# Patient Record
Sex: FEMALE | Race: WHITE | HISPANIC OR LATINO | Employment: PART TIME | URBAN - METROPOLITAN AREA
[De-identification: names, ages, dates, MRNs, and addresses within clinical notes are randomized per-mention and may not be internally consistent; named-entity substitution may affect disease eponyms.]

---

## 2022-04-01 ENCOUNTER — HOSPITAL ENCOUNTER (OUTPATIENT)
Dept: GENERAL RADIOLOGY | Facility: HOSPITAL | Age: 37
Discharge: HOME | End: 2022-04-01
Admitting: NURSE PRACTITIONER

## 2022-04-01 ENCOUNTER — TRANSCRIBE ORDERS (OUTPATIENT)
Dept: ADMINISTRATIVE | Facility: HOSPITAL | Age: 37
End: 2022-04-01

## 2022-04-01 DIAGNOSIS — S82.202A TIBIA/FIBULA FRACTURE, LEFT, CLOSED, INITIAL ENCOUNTER: ICD-10-CM

## 2022-04-01 DIAGNOSIS — S82.402A TIBIA/FIBULA FRACTURE, LEFT, CLOSED, INITIAL ENCOUNTER: Primary | ICD-10-CM

## 2022-04-01 DIAGNOSIS — S82.202A TIBIA/FIBULA FRACTURE, LEFT, CLOSED, INITIAL ENCOUNTER: Primary | ICD-10-CM

## 2022-04-01 DIAGNOSIS — S82.402A TIBIA/FIBULA FRACTURE, LEFT, CLOSED, INITIAL ENCOUNTER: ICD-10-CM

## 2022-04-01 PROCEDURE — 73590 X-RAY EXAM OF LOWER LEG: CPT

## 2022-05-03 ENCOUNTER — TRANSCRIBE ORDERS (OUTPATIENT)
Dept: ADMINISTRATIVE | Facility: HOSPITAL | Age: 37
End: 2022-05-03

## 2022-05-03 ENCOUNTER — HOSPITAL ENCOUNTER (OUTPATIENT)
Dept: CARDIOLOGY | Facility: HOSPITAL | Age: 37
Discharge: HOME | End: 2022-05-03
Admitting: NURSE PRACTITIONER

## 2022-05-03 DIAGNOSIS — R60.0 LOCALIZED EDEMA: ICD-10-CM

## 2022-05-03 DIAGNOSIS — R60.0 LOCALIZED EDEMA: Primary | ICD-10-CM

## 2022-05-03 LAB
BH CV LOWER VASCULAR LEFT COMMON FEMORAL AUGMENT: NORMAL
BH CV LOWER VASCULAR LEFT COMMON FEMORAL COMPETENT: NORMAL
BH CV LOWER VASCULAR LEFT COMMON FEMORAL COMPRESS: NORMAL
BH CV LOWER VASCULAR LEFT COMMON FEMORAL PHASIC: NORMAL
BH CV LOWER VASCULAR LEFT COMMON FEMORAL SPONT: NORMAL
BH CV LOWER VASCULAR LEFT DISTAL FEMORAL COMPRESS: NORMAL
BH CV LOWER VASCULAR LEFT GREATER SAPH AK COMPRESS: NORMAL
BH CV LOWER VASCULAR LEFT MID FEMORAL AUGMENT: NORMAL
BH CV LOWER VASCULAR LEFT MID FEMORAL COMPETENT: NORMAL
BH CV LOWER VASCULAR LEFT MID FEMORAL COMPRESS: NORMAL
BH CV LOWER VASCULAR LEFT MID FEMORAL PHASIC: NORMAL
BH CV LOWER VASCULAR LEFT MID FEMORAL SPONT: NORMAL
BH CV LOWER VASCULAR LEFT PERONEAL COMPRESS: NORMAL
BH CV LOWER VASCULAR LEFT POPLITEAL AUGMENT: NORMAL
BH CV LOWER VASCULAR LEFT POPLITEAL COMPETENT: NORMAL
BH CV LOWER VASCULAR LEFT POPLITEAL COMPRESS: NORMAL
BH CV LOWER VASCULAR LEFT POPLITEAL PHASIC: NORMAL
BH CV LOWER VASCULAR LEFT POPLITEAL SPONT: NORMAL
BH CV LOWER VASCULAR LEFT POSTERIOR TIBIAL COMPRESS: NORMAL
BH CV LOWER VASCULAR LEFT PROXIMAL FEMORAL COMPRESS: NORMAL
BH CV LOWER VASCULAR RIGHT COMMON FEMORAL AUGMENT: NORMAL
BH CV LOWER VASCULAR RIGHT COMMON FEMORAL COMPETENT: NORMAL
BH CV LOWER VASCULAR RIGHT COMMON FEMORAL COMPRESS: NORMAL
BH CV LOWER VASCULAR RIGHT COMMON FEMORAL PHASIC: NORMAL
BH CV LOWER VASCULAR RIGHT COMMON FEMORAL SPONT: NORMAL
MAXIMAL PREDICTED HEART RATE: 186 BPM
STRESS TARGET HR: 158 BPM

## 2022-05-03 PROCEDURE — 93971 EXTREMITY STUDY: CPT

## 2022-05-03 PROCEDURE — 93971 EXTREMITY STUDY: CPT | Performed by: SURGERY

## 2022-06-23 ENCOUNTER — APPOINTMENT (OUTPATIENT)
Dept: GENERAL RADIOLOGY | Facility: HOSPITAL | Age: 37
End: 2022-06-23

## 2022-06-23 ENCOUNTER — HOSPITAL ENCOUNTER (EMERGENCY)
Facility: HOSPITAL | Age: 37
Discharge: HOME | End: 2022-06-24
Attending: EMERGENCY MEDICINE | Admitting: EMERGENCY MEDICINE

## 2022-06-23 DIAGNOSIS — G62.9 NEUROPATHY: Primary | ICD-10-CM

## 2022-06-23 PROCEDURE — 73590 X-RAY EXAM OF LOWER LEG: CPT

## 2022-06-23 PROCEDURE — 99283 EMERGENCY DEPT VISIT LOW MDM: CPT

## 2022-06-23 PROCEDURE — 73562 X-RAY EXAM OF KNEE 3: CPT

## 2022-06-24 VITALS
HEART RATE: 79 BPM | DIASTOLIC BLOOD PRESSURE: 84 MMHG | HEIGHT: 68 IN | SYSTOLIC BLOOD PRESSURE: 128 MMHG | OXYGEN SATURATION: 100 % | BODY MASS INDEX: 34.79 KG/M2 | RESPIRATION RATE: 16 BRPM | TEMPERATURE: 98.2 F

## 2022-06-24 NOTE — DISCHARGE INSTRUCTIONS
Please follow-up with neurology discussed possible need for EMG    Return to the emergency room for uncontrolled pain, paleness to the foot, coldness to the foot, weakness to the foot or any new numbness that you may develop
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
## 2022-08-26 ENCOUNTER — HOSPITAL ENCOUNTER (EMERGENCY)
Facility: HOSPITAL | Age: 37
Discharge: LEFT BEFORE DISCHARGE | End: 2022-08-26

## 2022-08-26 VITALS
HEART RATE: 93 BPM | DIASTOLIC BLOOD PRESSURE: 76 MMHG | RESPIRATION RATE: 19 BRPM | SYSTOLIC BLOOD PRESSURE: 122 MMHG | BODY MASS INDEX: 35.92 KG/M2 | HEIGHT: 68 IN | OXYGEN SATURATION: 99 % | TEMPERATURE: 98.5 F | WEIGHT: 236.99 LBS

## 2022-08-26 LAB
GLUCOSE BLDC GLUCOMTR-MCNC: 103 MG/DL (ref 70–99)
GLUCOSE BLDC GLUCOMTR-MCNC: 98 MG/DL (ref 70–99)
HOLD SPECIMEN: NORMAL
HOLD SPECIMEN: NORMAL
WHOLE BLOOD HOLD COAG: NORMAL
WHOLE BLOOD HOLD SPECIMEN: NORMAL

## 2022-08-26 PROCEDURE — 82962 GLUCOSE BLOOD TEST: CPT

## 2022-08-26 PROCEDURE — 99211 OFF/OP EST MAY X REQ PHY/QHP: CPT

## 2022-08-26 PROCEDURE — 36415 COLL VENOUS BLD VENIPUNCTURE: CPT

## 2022-08-26 RX ORDER — SODIUM CHLORIDE 0.9 % (FLUSH) 0.9 %
10 SYRINGE (ML) INJECTION AS NEEDED
Status: DISCONTINUED | OUTPATIENT
Start: 2022-08-26 | End: 2022-08-26 | Stop reason: HOSPADM

## 2022-12-16 ENCOUNTER — HOSPITAL ENCOUNTER (OUTPATIENT)
Dept: GENERAL RADIOLOGY | Facility: HOSPITAL | Age: 37
Discharge: HOME | End: 2022-12-16

## 2022-12-16 ENCOUNTER — TRANSCRIBE ORDERS (OUTPATIENT)
Dept: GENERAL RADIOLOGY | Facility: HOSPITAL | Age: 37
End: 2022-12-16

## 2022-12-16 ENCOUNTER — TRANSCRIBE ORDERS (OUTPATIENT)
Dept: ADMINISTRATIVE | Facility: HOSPITAL | Age: 37
End: 2022-12-16

## 2022-12-16 DIAGNOSIS — M79.605 PAIN IN LEFT LEG: ICD-10-CM

## 2022-12-16 DIAGNOSIS — M79.605 PAIN IN LEFT LEG: Primary | ICD-10-CM

## 2022-12-16 DIAGNOSIS — R13.13 PHARYNGEAL DYSPHAGIA: Primary | ICD-10-CM

## 2022-12-16 PROCEDURE — 73502 X-RAY EXAM HIP UNI 2-3 VIEWS: CPT

## 2022-12-16 PROCEDURE — 73590 X-RAY EXAM OF LOWER LEG: CPT

## 2022-12-27 ENCOUNTER — HOSPITAL ENCOUNTER (OUTPATIENT)
Dept: GENERAL RADIOLOGY | Facility: HOSPITAL | Age: 37
Discharge: HOME | End: 2022-12-27
Admitting: NURSE PRACTITIONER

## 2022-12-27 DIAGNOSIS — R13.13 PHARYNGEAL DYSPHAGIA: ICD-10-CM

## 2022-12-27 PROCEDURE — 74221 X-RAY XM ESOPHAGUS 2CNTRST: CPT

## 2022-12-27 RX ADMIN — BARIUM SULFATE 700 MG: 700 TABLET ORAL at 09:36

## 2022-12-27 RX ADMIN — ANTACID/ANTIFLATULENT 1 PACKET: 380; 550; 10; 10 GRANULE, EFFERVESCENT ORAL at 09:36

## 2022-12-27 RX ADMIN — BARIUM SULFATE 355 ML: 0.6 SUSPENSION ORAL at 09:36

## 2022-12-27 RX ADMIN — BARIUM SULFATE 135 ML: 980 POWDER, FOR SUSPENSION ORAL at 09:36

## 2023-01-27 PROBLEM — M32.9 SYSTEMIC LUPUS ERYTHEMATOSUS: Status: ACTIVE | Noted: 2022-12-12

## 2023-01-27 PROBLEM — E78.1 HYPERTRIGLYCERIDEMIA: Status: ACTIVE | Noted: 2022-12-12

## 2023-01-27 PROBLEM — J45.909 ASTHMA: Status: ACTIVE | Noted: 2022-12-12

## 2023-01-30 ENCOUNTER — TRANSCRIBE ORDERS (OUTPATIENT)
Dept: LAB | Facility: HOSPITAL | Age: 38
End: 2023-01-30
Payer: COMMERCIAL

## 2023-01-30 ENCOUNTER — LAB (OUTPATIENT)
Dept: LAB | Facility: HOSPITAL | Age: 38
End: 2023-01-30
Payer: COMMERCIAL

## 2023-01-30 DIAGNOSIS — M25.50 PAIN IN JOINT, MULTIPLE SITES: ICD-10-CM

## 2023-01-30 DIAGNOSIS — Z79.899 ENCOUNTER FOR LONG-TERM (CURRENT) USE OF OTHER MEDICATIONS: Primary | ICD-10-CM

## 2023-01-30 DIAGNOSIS — Z01.89 DIAGNOSTIC SKIN AND SENSITIZATION TESTS: ICD-10-CM

## 2023-01-30 DIAGNOSIS — Z79.899 ENCOUNTER FOR LONG-TERM (CURRENT) USE OF OTHER MEDICATIONS: ICD-10-CM

## 2023-01-30 LAB
BASOPHILS # BLD AUTO: 0.06 10*3/MM3 (ref 0–0.2)
BASOPHILS NFR BLD AUTO: 0.7 % (ref 0–1.5)
BILIRUB UR QL STRIP: NEGATIVE
CLARITY UR: CLEAR
COLOR UR: YELLOW
DEPRECATED RDW RBC AUTO: 38.4 FL (ref 37–54)
EOSINOPHIL # BLD AUTO: 0.06 10*3/MM3 (ref 0–0.4)
EOSINOPHIL NFR BLD AUTO: 0.7 % (ref 0.3–6.2)
ERYTHROCYTE [DISTWIDTH] IN BLOOD BY AUTOMATED COUNT: 11.9 % (ref 12.3–15.4)
GLUCOSE UR STRIP-MCNC: NEGATIVE MG/DL
HCT VFR BLD AUTO: 38.8 % (ref 34–46.6)
HGB BLD-MCNC: 13.7 G/DL (ref 12–15.9)
HGB UR QL STRIP.AUTO: NEGATIVE
IMM GRANULOCYTES # BLD AUTO: 0.03 10*3/MM3 (ref 0–0.05)
IMM GRANULOCYTES NFR BLD AUTO: 0.3 % (ref 0–0.5)
KETONES UR QL STRIP: NEGATIVE
LEUKOCYTE ESTERASE UR QL STRIP.AUTO: NEGATIVE
LYMPHOCYTES # BLD AUTO: 2.2 10*3/MM3 (ref 0.7–3.1)
LYMPHOCYTES NFR BLD AUTO: 24.1 % (ref 19.6–45.3)
MCH RBC QN AUTO: 31.2 PG (ref 26.6–33)
MCHC RBC AUTO-ENTMCNC: 35.3 G/DL (ref 31.5–35.7)
MCV RBC AUTO: 88.4 FL (ref 79–97)
MONOCYTES # BLD AUTO: 0.49 10*3/MM3 (ref 0.1–0.9)
MONOCYTES NFR BLD AUTO: 5.4 % (ref 5–12)
NEUTROPHILS NFR BLD AUTO: 6.29 10*3/MM3 (ref 1.7–7)
NEUTROPHILS NFR BLD AUTO: 68.8 % (ref 42.7–76)
NITRITE UR QL STRIP: NEGATIVE
NRBC BLD AUTO-RTO: 0 /100 WBC (ref 0–0.2)
PH UR STRIP.AUTO: 6 [PH] (ref 5–8)
PLATELET # BLD AUTO: 255 10*3/MM3 (ref 140–450)
PMV BLD AUTO: 10.5 FL (ref 6–12)
PROT UR QL STRIP: NEGATIVE
RBC # BLD AUTO: 4.39 10*6/MM3 (ref 3.77–5.28)
SP GR UR STRIP: >=1.03 (ref 1–1.03)
UROBILINOGEN UR QL STRIP: NORMAL
WBC NRBC COR # BLD: 9.13 10*3/MM3 (ref 3.4–10.8)

## 2023-01-30 PROCEDURE — 81003 URINALYSIS AUTO W/O SCOPE: CPT

## 2023-01-30 PROCEDURE — 86235 NUCLEAR ANTIGEN ANTIBODY: CPT

## 2023-01-30 PROCEDURE — 85652 RBC SED RATE AUTOMATED: CPT

## 2023-01-30 PROCEDURE — 85025 COMPLETE CBC W/AUTO DIFF WBC: CPT

## 2023-01-30 PROCEDURE — 86431 RHEUMATOID FACTOR QUANT: CPT

## 2023-01-30 PROCEDURE — 82550 ASSAY OF CK (CPK): CPT

## 2023-01-30 PROCEDURE — 86200 CCP ANTIBODY: CPT

## 2023-01-30 PROCEDURE — 80074 ACUTE HEPATITIS PANEL: CPT

## 2023-01-30 PROCEDURE — 86140 C-REACTIVE PROTEIN: CPT

## 2023-01-30 PROCEDURE — 36415 COLL VENOUS BLD VENIPUNCTURE: CPT

## 2023-01-30 PROCEDURE — 86038 ANTINUCLEAR ANTIBODIES: CPT

## 2023-01-31 LAB
CCP IGA+IGG SERPL IA-ACNC: 0 UNITS (ref 0–19)
CHROMATIN AB SERPL-ACNC: 0.2 AI (ref 0–0.9)
CK SERPL-CCNC: 48 U/L (ref 20–180)
CRP SERPL-MCNC: <0.3 MG/DL (ref 0–0.5)
DSDNA AB SER-ACNC: 1 IU/ML (ref 0–9)
ENA RNP AB SER-ACNC: <0.2 AI (ref 0–0.9)
ENA SM AB SER-ACNC: <0.2 AI (ref 0–0.9)
ENA SS-A AB SER-ACNC: <0.2 AI (ref 0–0.9)
ENA SS-B AB SER-ACNC: <0.2 AI (ref 0–0.9)
ERYTHROCYTE [SEDIMENTATION RATE] IN BLOOD: 10 MM/HR (ref 0–20)
HAV IGM SERPL QL IA: NORMAL
HBV CORE IGM SERPL QL IA: NORMAL
HBV SURFACE AG SERPL QL IA: NORMAL
HCV AB SER DONR QL: NORMAL
RHEUMATOID FACT SERPL-ACNC: <10 IU/ML

## 2023-02-02 LAB
ANA HOMOGEN TITR SER: ABNORMAL {TITER}
ANA SER QL IF: POSITIVE
Lab: ABNORMAL

## 2023-03-27 ENCOUNTER — OFFICE VISIT (OUTPATIENT)
Dept: OBSTETRICS AND GYNECOLOGY | Facility: CLINIC | Age: 38
End: 2023-03-27
Payer: COMMERCIAL

## 2023-03-27 VITALS
HEIGHT: 68 IN | WEIGHT: 250 LBS | SYSTOLIC BLOOD PRESSURE: 117 MMHG | HEART RATE: 84 BPM | BODY MASS INDEX: 37.89 KG/M2 | DIASTOLIC BLOOD PRESSURE: 75 MMHG

## 2023-03-27 DIAGNOSIS — N76.0 ACUTE VAGINITIS: ICD-10-CM

## 2023-03-27 DIAGNOSIS — N94.10 FEMALE DYSPAREUNIA: Primary | ICD-10-CM

## 2023-03-27 DIAGNOSIS — N94.810 VESTIBULITIS, VULVAR: ICD-10-CM

## 2023-03-27 LAB
CANDIDA SPECIES: NEGATIVE
GARDNERELLA VAGINALIS: POSITIVE
T VAGINALIS DNA VAG QL PROBE+SIG AMP: NEGATIVE

## 2023-03-27 PROCEDURE — 87660 TRICHOMONAS VAGIN DIR PROBE: CPT | Performed by: OBSTETRICS & GYNECOLOGY

## 2023-03-27 PROCEDURE — 87510 GARDNER VAG DNA DIR PROBE: CPT | Performed by: OBSTETRICS & GYNECOLOGY

## 2023-03-27 PROCEDURE — 87480 CANDIDA DNA DIR PROBE: CPT | Performed by: OBSTETRICS & GYNECOLOGY

## 2023-03-27 NOTE — ASSESSMENT & PLAN NOTE
"States sex is painful  There is pain with manual stimulation that is focused near the anterior surface of the vagina  PIV intercourse is incredibly painful  Symptoms have only been present for the past 2 weeks  No change in partner  Doesn't feel dry   \"Feels like it is hitting a wall\" with penetration  S/p hysterectomy but still retains bilateral ovaries  Patient with hormonal vestibulitis on exam.  The vestibule has erythema and is tender to palpation anteriorly and posteriorly.  Urethral meatus is also tender to palpation  "

## 2023-03-27 NOTE — PROGRESS NOTES
"GYN new patient    CC: Dyspareunia    Tobacco/Nicotine use:  No    HPI:   37 y.o. Contraception or HRT: s/p HYST, still has one or both ovaries, benign indications  Pt has concerns she would like to discuss.      History: PMHx, Meds, Allergies, PSHx, Social Hx, and POBHx all reviewed and updated.  PCP:Quintin Stevenson MD      Review of Systems     /75   Pulse 84   Ht 172.7 cm (68\")   Wt 113 kg (250 lb)   Breastfeeding No   BMI 38.01 kg/m²     Physical Exam  Vitals and nursing note reviewed. Exam conducted with a chaperone present.   Constitutional:       Appearance: Normal appearance.   Cardiovascular:      Rate and Rhythm: Normal rate and regular rhythm.      Heart sounds: Normal heart sounds.   Pulmonary:      Effort: Pulmonary effort is normal.      Breath sounds: Normal breath sounds.   Abdominal:      General: Abdomen is flat. Bowel sounds are normal.      Palpations: Abdomen is soft.   Genitourinary:     Exam position: Lithotomy position.      Labia:         Right: No lesion.         Left: No lesion.       Urethra: No prolapse or urethral lesion.      Vagina: Tenderness ( Tenderness particularly along urethra.  No levator ani spasm or high tone pelvic floor dysfunction) present.      Cervix: Normal.      Uterus: Normal.        Neurological:      Mental Status: She is alert.         ASSESSMENT AND PLAN:  Problem Visit    Diagnoses and all orders for this visit:    1. Female dyspareunia (Primary)  Assessment & Plan:  States sex is painful  There is pain with manual stimulation that is focused near the anterior surface of the vagina  PIV intercourse is incredibly painful  Symptoms have only been present for the past 2 weeks  No change in partner  Doesn't feel dry   \"Feels like it is hitting a wall\" with penetration  S/p hysterectomy but still retains bilateral ovaries  Patient with hormonal vestibulitis on exam.  The vestibule has erythema and is tender to palpation anteriorly and posteriorly.  " Urethral meatus is also tender to palpation      2. Acute vaginitis  Assessment & Plan:  Patient with increasing vaginal discharge    Orders:  -     Gardnerella vaginalis, Trichomonas vaginalis, Candida albicans, DNA - Swab, Vagina    3. Vestibulitis, vulvar  -     testosterone; Rub in 1 pea size amount to vestibule 1-2 time daily.  Dispense: 30 g; Refill: 5      Counseling:     Vulvar vestibulitis and treatment with estrogen plus testosterone cream.  Would avoid penetrative intimacy for at least 6 weeks              Follow Up:  Return in about 6 weeks (around 5/8/2023).        Kimberly Guadalupe MD  03/27/2023

## 2023-03-28 DIAGNOSIS — B96.89 BV (BACTERIAL VAGINOSIS): Primary | ICD-10-CM

## 2023-03-28 DIAGNOSIS — N76.0 BV (BACTERIAL VAGINOSIS): Primary | ICD-10-CM

## 2023-03-28 RX ORDER — METRONIDAZOLE 500 MG/1
500 TABLET ORAL 3 TIMES DAILY
Qty: 21 TABLET | Refills: 0 | Status: SHIPPED | OUTPATIENT
Start: 2023-03-28 | End: 2023-04-04

## 2023-03-31 ENCOUNTER — TELEPHONE (OUTPATIENT)
Dept: OBSTETRICS AND GYNECOLOGY | Facility: CLINIC | Age: 38
End: 2023-03-31
Payer: COMMERCIAL

## 2023-03-31 NOTE — TELEPHONE ENCOUNTER
Patient called back and scheduled for an appointment. She was advised to try Sitz baths 2-3 times daily until she is seen.

## 2023-03-31 NOTE — TELEPHONE ENCOUNTER
Caller: Yandel Dean    Relationship to patient: Self    Best call back number: [phone]    Patient is needing: PT NOTICED VAGINAL LUMP TODAY, SHE IS WANTING TO BE SEEN ASAP, UNABLE TO WT, OK TO CALLBACK ANYTIME, OK TO LEAVE A VM

## 2023-05-19 ENCOUNTER — PREP FOR SURGERY (OUTPATIENT)
Dept: OTHER | Facility: HOSPITAL | Age: 38
End: 2023-05-19
Payer: COMMERCIAL

## 2023-05-19 DIAGNOSIS — J03.00 CHRONIC STREPTOCOCCAL TONSILLITIS: ICD-10-CM

## 2023-05-19 DIAGNOSIS — Z01.818 PREOP TESTING: Primary | ICD-10-CM

## 2023-05-19 DIAGNOSIS — R13.10 DYSPHAGIA: ICD-10-CM

## 2023-05-19 DIAGNOSIS — G47.33 OSA (OBSTRUCTIVE SLEEP APNEA): Primary | ICD-10-CM

## 2023-05-19 DIAGNOSIS — J35.01 CHRONIC STREPTOCOCCAL TONSILLITIS: ICD-10-CM

## 2023-05-19 DIAGNOSIS — J35.3 ADENOTONSILLAR HYPERTROPHY: ICD-10-CM

## 2023-05-22 PROBLEM — R13.10 DYSPHAGIA: Status: ACTIVE | Noted: 2023-05-22

## 2023-05-22 PROBLEM — J03.00 CHRONIC STREPTOCOCCAL TONSILLITIS: Status: ACTIVE | Noted: 2023-05-22

## 2023-05-22 PROBLEM — J35.3 ADENOTONSILLAR HYPERTROPHY: Status: ACTIVE | Noted: 2023-05-22

## 2023-05-22 PROBLEM — G47.33 OSA (OBSTRUCTIVE SLEEP APNEA): Status: ACTIVE | Noted: 2023-05-22

## 2023-05-22 PROBLEM — J35.01 CHRONIC STREPTOCOCCAL TONSILLITIS: Status: ACTIVE | Noted: 2023-05-22

## 2023-05-23 ENCOUNTER — TELEPHONE (OUTPATIENT)
Dept: OBSTETRICS AND GYNECOLOGY | Facility: CLINIC | Age: 38
End: 2023-05-23
Payer: COMMERCIAL

## 2023-05-23 RX ORDER — LEVOCETIRIZINE DIHYDROCHLORIDE 5 MG/1
5 TABLET, FILM COATED ORAL EVERY EVENING
COMMUNITY

## 2023-05-23 NOTE — PRE-PROCEDURE INSTRUCTIONS
PATIENT INSTRUCTED TO BE:    - NPO AFTER MIDNIGHT EXCEPT CAN HAVE CLEAR LIQUIDS 2 HOURS PRIOR TO SURGERY ARRIVAL TIME     - TO HOLD ALL VITAMINS, SUPPLEMENTS, NSAIDS FOR ONE WEEK PRIOR TO THEIR SURGICAL PROCEDURE    -  INSTRUCTED NO LOTIONS, JEWELRY, PIERCINGS, OR DEODORANT DAY OF SURGERY    - IF DIABETIC, CHECK BLOOD GLUCOSE IF LESS THAN 70 CALL PREOP AREA -AM OF SURGERY     - INSTRUCTED TO TAKE THE FOLLOWING MEDICATIONS THE DAY OF SURGERY:    INHALERS PRN, LIPITOR, XYZAL, PRILOSEC, BENADRYL PRN    PATIENT VERBALIZED UNDERSTANDING

## 2023-05-24 ENCOUNTER — LAB (OUTPATIENT)
Dept: LAB | Facility: HOSPITAL | Age: 38
End: 2023-05-24
Payer: COMMERCIAL

## 2023-05-24 DIAGNOSIS — Z01.818 PREOP TESTING: ICD-10-CM

## 2023-05-24 LAB
APTT PPP: 26.5 SECONDS (ref 24.2–34.2)
BASOPHILS # BLD AUTO: 0.07 10*3/MM3 (ref 0–0.2)
BASOPHILS NFR BLD AUTO: 0.8 % (ref 0–1.5)
DEPRECATED RDW RBC AUTO: 37.4 FL (ref 37–54)
EOSINOPHIL # BLD AUTO: 0.1 10*3/MM3 (ref 0–0.4)
EOSINOPHIL NFR BLD AUTO: 1.2 % (ref 0.3–6.2)
ERYTHROCYTE [DISTWIDTH] IN BLOOD BY AUTOMATED COUNT: 11.5 % (ref 12.3–15.4)
HCG SERPL QL: NEGATIVE
HCT VFR BLD AUTO: 40 % (ref 34–46.6)
HGB BLD-MCNC: 14.2 G/DL (ref 12–15.9)
IMM GRANULOCYTES # BLD AUTO: 0.04 10*3/MM3 (ref 0–0.05)
IMM GRANULOCYTES NFR BLD AUTO: 0.5 % (ref 0–0.5)
INR PPP: 1.02 (ref 0.86–1.15)
LYMPHOCYTES # BLD AUTO: 2.57 10*3/MM3 (ref 0.7–3.1)
LYMPHOCYTES NFR BLD AUTO: 30.6 % (ref 19.6–45.3)
MCH RBC QN AUTO: 31.1 PG (ref 26.6–33)
MCHC RBC AUTO-ENTMCNC: 35.5 G/DL (ref 31.5–35.7)
MCV RBC AUTO: 87.7 FL (ref 79–97)
MONOCYTES # BLD AUTO: 0.54 10*3/MM3 (ref 0.1–0.9)
MONOCYTES NFR BLD AUTO: 6.4 % (ref 5–12)
NEUTROPHILS NFR BLD AUTO: 5.09 10*3/MM3 (ref 1.7–7)
NEUTROPHILS NFR BLD AUTO: 60.5 % (ref 42.7–76)
NRBC BLD AUTO-RTO: 0 /100 WBC (ref 0–0.2)
PLATELET # BLD AUTO: 312 10*3/MM3 (ref 140–450)
PMV BLD AUTO: 11.5 FL (ref 6–12)
PROTHROMBIN TIME: 13.5 SECONDS (ref 11.8–14.9)
RBC # BLD AUTO: 4.56 10*6/MM3 (ref 3.77–5.28)
WBC NRBC COR # BLD: 8.41 10*3/MM3 (ref 3.4–10.8)

## 2023-05-24 PROCEDURE — 84703 CHORIONIC GONADOTROPIN ASSAY: CPT

## 2023-05-24 PROCEDURE — 85610 PROTHROMBIN TIME: CPT

## 2023-05-24 PROCEDURE — 85025 COMPLETE CBC W/AUTO DIFF WBC: CPT

## 2023-05-24 PROCEDURE — 85730 THROMBOPLASTIN TIME PARTIAL: CPT

## 2023-05-24 PROCEDURE — 36415 COLL VENOUS BLD VENIPUNCTURE: CPT

## 2023-05-26 ENCOUNTER — HOSPITAL ENCOUNTER (OUTPATIENT)
Facility: HOSPITAL | Age: 38
Setting detail: HOSPITAL OUTPATIENT SURGERY
Discharge: HOME | End: 2023-05-26
Attending: OTOLARYNGOLOGY | Admitting: OTOLARYNGOLOGY
Payer: COMMERCIAL

## 2023-05-26 ENCOUNTER — ANESTHESIA EVENT (OUTPATIENT)
Dept: PERIOP | Facility: HOSPITAL | Age: 38
End: 2023-05-26
Payer: COMMERCIAL

## 2023-05-26 ENCOUNTER — ANESTHESIA (OUTPATIENT)
Dept: PERIOP | Facility: HOSPITAL | Age: 38
End: 2023-05-26
Payer: COMMERCIAL

## 2023-05-26 VITALS
HEART RATE: 87 BPM | DIASTOLIC BLOOD PRESSURE: 80 MMHG | BODY MASS INDEX: 40.56 KG/M2 | WEIGHT: 267.64 LBS | HEIGHT: 68 IN | TEMPERATURE: 98.1 F | OXYGEN SATURATION: 99 % | RESPIRATION RATE: 16 BRPM | SYSTOLIC BLOOD PRESSURE: 142 MMHG

## 2023-05-26 DIAGNOSIS — R13.10 DYSPHAGIA: ICD-10-CM

## 2023-05-26 DIAGNOSIS — G47.33 OSA (OBSTRUCTIVE SLEEP APNEA): ICD-10-CM

## 2023-05-26 DIAGNOSIS — J03.00 CHRONIC STREPTOCOCCAL TONSILLITIS: ICD-10-CM

## 2023-05-26 DIAGNOSIS — J35.3 ADENOTONSILLAR HYPERTROPHY: ICD-10-CM

## 2023-05-26 DIAGNOSIS — G89.18 POSTOPERATIVE PAIN: Primary | ICD-10-CM

## 2023-05-26 DIAGNOSIS — J35.01 CHRONIC STREPTOCOCCAL TONSILLITIS: ICD-10-CM

## 2023-05-26 PROCEDURE — 25010000002 HYDROMORPHONE 1 MG/ML SOLUTION: Performed by: NURSE ANESTHETIST, CERTIFIED REGISTERED

## 2023-05-26 PROCEDURE — 25010000002 PROPOFOL 10 MG/ML EMULSION: Performed by: NURSE ANESTHETIST, CERTIFIED REGISTERED

## 2023-05-26 PROCEDURE — 25010000002 MIDAZOLAM PER 1MG: Performed by: ANESTHESIOLOGY

## 2023-05-26 PROCEDURE — 88304 TISSUE EXAM BY PATHOLOGIST: CPT | Performed by: OTOLARYNGOLOGY

## 2023-05-26 PROCEDURE — 25010000002 ONDANSETRON PER 1 MG: Performed by: NURSE ANESTHETIST, CERTIFIED REGISTERED

## 2023-05-26 PROCEDURE — 25010000002 FENTANYL CITRATE (PF) 50 MCG/ML SOLUTION: Performed by: NURSE ANESTHETIST, CERTIFIED REGISTERED

## 2023-05-26 PROCEDURE — 0 MEPERIDINE PER 100 MG: Performed by: NURSE ANESTHETIST, CERTIFIED REGISTERED

## 2023-05-26 PROCEDURE — 25010000002 SUGAMMADEX 200 MG/2ML SOLUTION: Performed by: NURSE ANESTHETIST, CERTIFIED REGISTERED

## 2023-05-26 PROCEDURE — 25010000002 DEXAMETHASONE PER 1 MG: Performed by: NURSE ANESTHETIST, CERTIFIED REGISTERED

## 2023-05-26 RX ORDER — LIDOCAINE HYDROCHLORIDE 20 MG/ML
INJECTION, SOLUTION EPIDURAL; INFILTRATION; INTRACAUDAL; PERINEURAL AS NEEDED
Status: DISCONTINUED | OUTPATIENT
Start: 2023-05-26 | End: 2023-05-26 | Stop reason: SURG

## 2023-05-26 RX ORDER — OLANZAPINE 10 MG/1
10 TABLET ORAL NIGHTLY
COMMUNITY

## 2023-05-26 RX ORDER — MEPERIDINE HYDROCHLORIDE 25 MG/ML
12.5 INJECTION INTRAMUSCULAR; INTRAVENOUS; SUBCUTANEOUS
Status: DISCONTINUED | OUTPATIENT
Start: 2023-05-26 | End: 2023-05-26 | Stop reason: HOSPADM

## 2023-05-26 RX ORDER — FENTANYL CITRATE 50 UG/ML
INJECTION, SOLUTION INTRAMUSCULAR; INTRAVENOUS AS NEEDED
Status: DISCONTINUED | OUTPATIENT
Start: 2023-05-26 | End: 2023-05-26 | Stop reason: SURG

## 2023-05-26 RX ORDER — LIDOCAINE HYDROCHLORIDE AND EPINEPHRINE 10; 10 MG/ML; UG/ML
INJECTION, SOLUTION INFILTRATION; PERINEURAL AS NEEDED
Status: DISCONTINUED | OUTPATIENT
Start: 2023-05-26 | End: 2023-05-26 | Stop reason: HOSPADM

## 2023-05-26 RX ORDER — ROCURONIUM BROMIDE 10 MG/ML
INJECTION, SOLUTION INTRAVENOUS AS NEEDED
Status: DISCONTINUED | OUTPATIENT
Start: 2023-05-26 | End: 2023-05-26 | Stop reason: SURG

## 2023-05-26 RX ORDER — DEXAMETHASONE SODIUM PHOSPHATE 4 MG/ML
INJECTION, SOLUTION INTRA-ARTICULAR; INTRALESIONAL; INTRAMUSCULAR; INTRAVENOUS; SOFT TISSUE AS NEEDED
Status: DISCONTINUED | OUTPATIENT
Start: 2023-05-26 | End: 2023-05-26 | Stop reason: SURG

## 2023-05-26 RX ORDER — PROCHLORPERAZINE EDISYLATE 5 MG/ML
5 INJECTION INTRAMUSCULAR; INTRAVENOUS ONCE
Status: DISCONTINUED | OUTPATIENT
Start: 2023-05-26 | End: 2023-05-26 | Stop reason: HOSPADM

## 2023-05-26 RX ORDER — ONDANSETRON 2 MG/ML
INJECTION INTRAMUSCULAR; INTRAVENOUS AS NEEDED
Status: DISCONTINUED | OUTPATIENT
Start: 2023-05-26 | End: 2023-05-26 | Stop reason: SURG

## 2023-05-26 RX ORDER — MIDAZOLAM HYDROCHLORIDE 2 MG/2ML
4 INJECTION, SOLUTION INTRAMUSCULAR; INTRAVENOUS ONCE
Status: COMPLETED | OUTPATIENT
Start: 2023-05-26 | End: 2023-05-26

## 2023-05-26 RX ORDER — ZIPRASIDONE HYDROCHLORIDE 40 MG/1
40 CAPSULE ORAL 2 TIMES DAILY WITH MEALS
COMMUNITY

## 2023-05-26 RX ORDER — OXYCODONE HYDROCHLORIDE 5 MG/1
5 TABLET ORAL
Status: DISCONTINUED | OUTPATIENT
Start: 2023-05-26 | End: 2023-05-26 | Stop reason: HOSPADM

## 2023-05-26 RX ORDER — ONDANSETRON 2 MG/ML
4 INJECTION INTRAMUSCULAR; INTRAVENOUS ONCE AS NEEDED
Status: DISCONTINUED | OUTPATIENT
Start: 2023-05-26 | End: 2023-05-26 | Stop reason: HOSPADM

## 2023-05-26 RX ORDER — SODIUM CHLORIDE, SODIUM LACTATE, POTASSIUM CHLORIDE, CALCIUM CHLORIDE 600; 310; 30; 20 MG/100ML; MG/100ML; MG/100ML; MG/100ML
9 INJECTION, SOLUTION INTRAVENOUS CONTINUOUS PRN
Status: DISCONTINUED | OUTPATIENT
Start: 2023-05-26 | End: 2023-05-26 | Stop reason: HOSPADM

## 2023-05-26 RX ORDER — DESVENLAFAXINE 25 MG/1
25 TABLET, EXTENDED RELEASE ORAL DAILY
COMMUNITY

## 2023-05-26 RX ORDER — PROPOFOL 10 MG/ML
VIAL (ML) INTRAVENOUS AS NEEDED
Status: DISCONTINUED | OUTPATIENT
Start: 2023-05-26 | End: 2023-05-26 | Stop reason: SURG

## 2023-05-26 RX ORDER — PROMETHAZINE HYDROCHLORIDE 25 MG/1
25 SUPPOSITORY RECTAL ONCE AS NEEDED
Status: DISCONTINUED | OUTPATIENT
Start: 2023-05-26 | End: 2023-05-26 | Stop reason: HOSPADM

## 2023-05-26 RX ORDER — ACETAMINOPHEN 500 MG
1000 TABLET ORAL ONCE
Status: COMPLETED | OUTPATIENT
Start: 2023-05-26 | End: 2023-05-26

## 2023-05-26 RX ORDER — PROMETHAZINE HYDROCHLORIDE 12.5 MG/1
25 TABLET ORAL ONCE AS NEEDED
Status: DISCONTINUED | OUTPATIENT
Start: 2023-05-26 | End: 2023-05-26 | Stop reason: HOSPADM

## 2023-05-26 RX ADMIN — ACETAMINOPHEN 1000 MG: 500 TABLET ORAL at 08:15

## 2023-05-26 RX ADMIN — FENTANYL CITRATE 100 MCG: 50 INJECTION, SOLUTION INTRAMUSCULAR; INTRAVENOUS at 08:53

## 2023-05-26 RX ADMIN — SODIUM CHLORIDE, POTASSIUM CHLORIDE, SODIUM LACTATE AND CALCIUM CHLORIDE 9 ML/HR: 600; 310; 30; 20 INJECTION, SOLUTION INTRAVENOUS at 08:16

## 2023-05-26 RX ADMIN — HYDROMORPHONE HYDROCHLORIDE 0.5 MG: 1 INJECTION, SOLUTION INTRAMUSCULAR; INTRAVENOUS; SUBCUTANEOUS at 10:03

## 2023-05-26 RX ADMIN — PROPOFOL 200 MG: 10 INJECTION, EMULSION INTRAVENOUS at 08:53

## 2023-05-26 RX ADMIN — MIDAZOLAM HYDROCHLORIDE 4 MG: 1 INJECTION, SOLUTION INTRAMUSCULAR; INTRAVENOUS at 08:25

## 2023-05-26 RX ADMIN — ROCURONIUM BROMIDE 50 MG: 10 INJECTION, SOLUTION INTRAVENOUS at 08:53

## 2023-05-26 RX ADMIN — ONDANSETRON 4 MG: 2 INJECTION INTRAMUSCULAR; INTRAVENOUS at 09:03

## 2023-05-26 RX ADMIN — SUGAMMADEX 200 MG: 100 INJECTION, SOLUTION INTRAVENOUS at 09:27

## 2023-05-26 RX ADMIN — FENTANYL CITRATE 50 MCG: 50 INJECTION, SOLUTION INTRAMUSCULAR; INTRAVENOUS at 09:18

## 2023-05-26 RX ADMIN — DEXAMETHASONE SODIUM PHOSPHATE 4 MG: 4 INJECTION, SOLUTION INTRA-ARTICULAR; INTRALESIONAL; INTRAMUSCULAR; INTRAVENOUS; SOFT TISSUE at 09:03

## 2023-05-26 RX ADMIN — MEPERIDINE HYDROCHLORIDE 12.5 MG: 25 INJECTION INTRAMUSCULAR; INTRAVENOUS; SUBCUTANEOUS at 10:10

## 2023-05-26 RX ADMIN — FENTANYL CITRATE 50 MCG: 50 INJECTION, SOLUTION INTRAMUSCULAR; INTRAVENOUS at 09:13

## 2023-05-26 RX ADMIN — LIDOCAINE HYDROCHLORIDE 100 MG: 20 INJECTION, SOLUTION EPIDURAL; INFILTRATION; INTRACAUDAL; PERINEURAL at 08:53

## 2023-05-26 NOTE — DISCHARGE INSTRUCTIONS
DISCHARGE INSTRUCTIONS  TONSILLECTOMY/ADENOIDECTOMY  For your surgery you had:  General anesthesia (you may have a sore throat for the first 24 hours)  You received an anesthesia medication today that can cause hormonal forms of birth control to be ineffective. You should use a different form of birth control (to prevent pregnancy) for 7 days.  You may experience dizziness, drowsiness, or lightheadedness for several hours following surgery.  Do not stay alone today or tonight.  Limit your activity for 24 hours.  You should not drive or operate machinery, drink alcohol, or sign legally binding documents for 24 hours or while you are taking pain medication.  Resume your diet slowly.  Follow any special dietary instructions you may have been given by your doctor.    NOTIFY YOUR DOCTOR IF YOU EXPERIENCE ANY OF THE FOLLOWING:  Temperature greater than 102° Fahrenheit  Shaking chills  Redness or excessive drainage from incision  Nausea, vomiting and/or pain that is not controlled by prescribed medications  Increase in bleeding or bleeding that is excessive  Unable to urinate in 6 hours after surgery  If unable to reach your doctor, please go to the closest Emergency room Encourage the patient to drink liquids every hour the day of surgery and every two hours during the night.  We would like for the patient to drink at least 2-3 quarts of liquid within a 24-hour period.  Avoid red liquids.  Keep cool mist humidifier in the room with the patient.  If excessive bleeding should occur, bring the patient to the Emergency Room.  The ER doctor will notify the doctor.  If low grade fever develops, encourage the patient to drink more.  If temperature is over 102°, notify your doctor.  Rest is encouraged for several days following surgery.  Keep head elevated on at least one pillow.  Medications per physician instructions as indicated on Discharge Medication Information Sheet.      SPECIAL INSTRUCTIONS:  Follow verbal instructions  and written instructions on After Visit Summary given by Dr. Montero.      Last dose of pain medication was given at:  Tylenol (1000mg) last at 8:15am. Do not exceed 4000mg of tylenol in a 24 hour period.

## 2023-05-26 NOTE — OP NOTE
TONSILLECTOMY AND NASOPHARYNGOSCOPY Procedure Report    Patient Name:  Yandel Dean  YOB: 1985    Date of Surgery:  5/26/2023     Indications:    Yandel Dean is a 37 year old female who presents with chronic tonsillitis, dysphagia, DEVIN, and streptococcal tonsillitis.  A tonsillectomy and adenoidectomy was felt to be indicated due to the patient's history. The risks and benefits were explained including but not limited to pain, aural fullness, early and late bleeding, infection, risks of the general anesthesia, dysphagia and poor PO intake, and voice change/VPI.  Alternatives were discussed. Questions were asked appropriately answered.    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  NASOPHARYNGOSCOPY  2.  TONSILLECTOMY     Surgeon:  Wilner Montero MD      Staff:  Jose Strickland MD    Circulator: Mor Boothe RN; Manda Borjas RN  Scrub Person: Cordell Shi     Anesthesia: General    Estimated Blood Loss: 15 mL    Implants:    Nothing was implanted during the procedure    Specimen:          Specimens     ID Source Type Tests Collected By Collected At [facility]?    A Tonsil, Left Tissue · TISSUE PATHOLOGY EXAM   Wilner Montero MD 5/26/23 4467 No    Description: Left tonsil    B Tonsil, Right Tissue · TISSUE PATHOLOGY EXAM   Wilner Montero MD 5/26/23 0859 No    Description: Right tonsil        Findings:      1.  2+ tonsils with extensive cryptic debris and peritonsillar scarring  2.  No submucous cleft on palpation and inspection  3.  no adenoid tissue present and eustachian tubes unremarkable  4.  All dentitions left  intact     Complications: None     Description of Procedure:      Patient was taken to the operating room and general endotracheal anesthesia was performed in supine position.  After adequate anesthesia was obtained and endotracheal tube secured, table was turned.  Patient was then placed in hyperextension with mouthgag retractor in place.  Throat pack was placed and soft palate was retracted with red rubber catheter and indirect mirror examination of nasopharynx revealed no adenoid tissue and eustachian tubes were unremarkable.  Also palpation and inspection revealed no evidence of any submucous cleft.  Both tonsillar pillars were then injected with total of 10 mL of 1% Xylocaine with 1-100,000 epinephrine.  Using suction electrocautery both tonsils were dissected and removed. Both tonsillar fossa was cauterized for hemostasis.  Electrocautery settings were 120 cutting and 30 coag.  After obtaining excellent hemostasis,  throat pack was removed and orogastric tube was passed down to suction out gastric contents.  Mouthgag retractor was removed and all dentitions were noted to be intact.  At this point oral airway was placed. Subsequently patient was extubated and transported to recovery room in good condition following extubation.    Wilner Montero MD     Date: 5/26/2023  Time: 08:30 EDT

## 2023-05-26 NOTE — ANESTHESIA POSTPROCEDURE EVALUATION
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        Post Anesthesia Care and Evaluation    Patient location during evaluation: bedside  Patient participation: complete - patient participated  Level of consciousness: awake  Pain management: adequate    Airway patency: patent  PONV Status: none  Cardiovascular status: acceptable  Respiratory status: acceptable  Hydration status: acceptable    Comments: An Anesthesiologist personally participated in the most demanding procedures (including induction and emergence if applicable) in the anesthesia plan, monitored the course of anesthesia administration at frequent intervals and remained physically present and available for immediate diagnosis and treatment of emergencies.

## 2023-05-26 NOTE — ANESTHESIA PREPROCEDURE EVALUATION
Anesthesia Evaluation     Patient summary reviewed and Nursing notes reviewed   no history of anesthetic complications:  NPO Solid Status: > 8 hours  NPO Liquid Status: > 2 hours           Airway   Mallampati: II  TM distance: >3 FB  Neck ROM: full  No difficulty expected  Dental      Pulmonary - normal exam    breath sounds clear to auscultation  (+) asthma,recent URI, sleep apnea,   Cardiovascular - normal exam  Exercise tolerance: good (4-7 METS)    Rhythm: regular  Rate: normal    (+) hyperlipidemia,       Neuro/Psych  (+) headaches, psychiatric history,    GI/Hepatic/Renal/Endo - negative ROS     Musculoskeletal (-) negative ROS    Abdominal    Substance History - negative use     OB/GYN negative ob/gyn ROS         Other - negative ROS       ROS/Med Hx Other: PAT Nursing Notes unavailable.                 Anesthesia Plan    ASA 3     general     (Patient understands anesthesia not responsible for dental damage.    Pt extremely anxious, has PTSD, per pt avoid touching or startling her after surgery)  intravenous induction     Anesthetic plan, risks, benefits, and alternatives have been provided, discussed and informed consent has been obtained with: patient.    Use of blood products discussed with patient .   Plan discussed with CRNA.        CODE STATUS:

## 2023-05-26 NOTE — H&P
PRIMARY CARE PROVIDER: Quintin Stevenson MD  REFERRING PROVIDER: Quintin Stevenson MD    CHIEF COMPLAINT:  Preoperative evaluation for surgery    Subjective   History of Present Illness:  Yandel Dean is a  37 y.o.  female who is here for the following problems:    DEVIN (obstructive sleep apnea)    Adenotonsillar hypertrophy    Dysphagia    Chronic streptococcal tonsillitis      She is scheduled for TONSILLECTOMY AND ADENOIDECTOMY, NASOPHARYNGOSCOPY (Bilateral). There has been no significant change in the history since the preoperative office evaluation.     Review of Systems:  CONSTITUTIONAL: no fever or chills  PULMONARY: no cough or shortness of breath  GI: no nausea or vomiting    Past History:  Medical History: has a past medical history of Asthma, Bipolar disorder, Depression, Elevated cholesterol, Enlarged tonsils, Hypoglycemia, Lupus, Migraine, PTSD (post-traumatic stress disorder), and Recurrent streptococcal tonsillitis.   Surgical History: has a past surgical history that includes Hysterectomy and Leg Surgery (Left).   Family History: family history includes Breast cancer (age of onset: 35) in her maternal grandmother; Ovarian cancer (age of onset: 38) in her maternal grandmother.   Social History: reports that she quit smoking about 11 years ago. Her smoking use included cigarettes. She smoked an average of 1 pack per day. She has never been exposed to tobacco smoke. She has never used smokeless tobacco. She reports current alcohol use. She reports that she does not currently use drugs after having used the following drugs: Methamphetamines and Heroin.  Home Medications:  Vitamin D3, albuterol sulfate HFA, atorvastatin, diphenhydrAMINE, levocetirizine, omeprazole, and triamcinolone     Allergies: Penicillins and Sulfa antibiotics     Objective     Vital Signs:  Temp:  [97.8 °F (36.6 °C)] 97.8 °F (36.6 °C)  Heart Rate:  [90] 90  Resp:  [18] 18  BP: (129)/(90) 129/90    Physical Exam:  CONSTITUTIONAL: well nourished,  well-developed, alert, oriented, in no acute distress   COMMUNICATION AND VOICE: able to communicate normally, normal voice quality  HEAD: normocephalic, no lesions, atraumatic, no tenderness, no masses   FACE: appearance normal, no lesions, no tenderness, no deformities, facial motion symmetric  EYES: ocular motility normal, eyelids normal, orbits normal, no proptosis, conjunctiva normal , pupils equal, round   EARS:  Hearing: hearing to conversational voice intact bilaterally   External Ears: normal bilaterally, no lesions  NOSE:  External Nose: external nasal structure normal, no tenderness on palpation, no nasal discharge, no lesions, no evidence of trauma, nostrils patent   ORAL:  Lips: upper and lower lips without lesion   3+ TONSILS WITH CRYPTIC DEBRIS  NECK:  Inspection and Palpation: neck appearance normal, no masses or tenderness  CHEST/RESPIRATORY: normal respiratory effort   CARDIOVASCULAR: no cyanosis or edema   NEUROLOGICAL/PSYCHIATRIC: oriented to time, place and person, mood normal, affect appropriate, CN II-XII intact grossly    ASSESSMENT:    DEVIN (obstructive sleep apnea)    Adenotonsillar hypertrophy    Dysphagia    Chronic streptococcal tonsillitis    PLAN:  TONSILLECTOMY AND ADENOIDECTOMY, NASOPHARYNGOSCOPY (Bilateral)  The risks and benefits were explained including but not limited to early and late bleeding, infection, risks of the general anesthesia, dysphagia and poor PO intake, and voice change/VPI.  Alternatives were discussed. The patient/parents understood these risks and wanted to proceed. Questions were asked appropriately answered.      Wilner Montero MD  05/26/23  07:12 EDT

## 2023-05-27 ENCOUNTER — HOSPITAL ENCOUNTER (EMERGENCY)
Facility: HOSPITAL | Age: 38
Discharge: HOME | End: 2023-05-27
Attending: EMERGENCY MEDICINE
Payer: COMMERCIAL

## 2023-05-27 VITALS
DIASTOLIC BLOOD PRESSURE: 68 MMHG | HEART RATE: 101 BPM | SYSTOLIC BLOOD PRESSURE: 121 MMHG | OXYGEN SATURATION: 97 % | TEMPERATURE: 97 F | BODY MASS INDEX: 42.2 KG/M2 | RESPIRATION RATE: 18 BRPM | HEIGHT: 68 IN | WEIGHT: 278.44 LBS

## 2023-05-27 DIAGNOSIS — G89.18 POSTOPERATIVE PAIN: Primary | ICD-10-CM

## 2023-05-27 LAB
HCT VFR BLD AUTO: 35 % (ref 34–46.6)
HGB BLD-MCNC: 12.5 G/DL (ref 12–15.9)

## 2023-05-27 PROCEDURE — 85018 HEMOGLOBIN: CPT | Performed by: EMERGENCY MEDICINE

## 2023-05-27 PROCEDURE — 36415 COLL VENOUS BLD VENIPUNCTURE: CPT

## 2023-05-27 PROCEDURE — 85014 HEMATOCRIT: CPT | Performed by: EMERGENCY MEDICINE

## 2023-05-27 PROCEDURE — 99282 EMERGENCY DEPT VISIT SF MDM: CPT

## 2023-05-28 NOTE — ED PROVIDER NOTES
Time: 12:16 AM EDT  Date of encounter:  5/27/2023  Independent Historian/Clinical History and Information was obtained by:   Patient  Chief Complaint: Postoperative bleeding    History is limited by: N/A    History of Present Illness:  Patient is a 37 y.o. year old female who presents to the emergency department for evaluation of postoperative bleeding.  Patient presents to the emergency department status post tonsillectomy yesterday.  She reports she coughed several times and had what she believes to be a significant amount of blood come from her throat.  HPI    Patient Care Team  Primary Care Provider: Quintin Stevenson MD    Past Medical History:     Allergies   Allergen Reactions   • Penicillins Anaphylaxis   • Sulfa Antibiotics Anaphylaxis     Past Medical History:   Diagnosis Date   • Asthma     USES INHALERS   • Bipolar disorder    • Depression    • Elevated cholesterol    • Enlarged tonsils    • Hypoglycemia    • Lupus    • Migraine    • PTSD (post-traumatic stress disorder)    • Recurrent streptococcal tonsillitis      Past Surgical History:   Procedure Laterality Date   • HYSTERECTOMY     • LEG SURGERY Left     plate, juan, bolts and screws from knee to ankle   • TONSILLECTOMY AND ADENOIDECTOMY Bilateral 5/26/2023    Procedure: TONSILLECTOMY AND NASOPHARYNGOSCOPY;  Surgeon: Wilner Montero MD;  Location: [facility] MAIN OR;  Service: ENT;  Laterality: Bilateral;     Family History   Problem Relation Age of Onset   • Breast cancer Maternal Grandmother 35   • Ovarian cancer Maternal Grandmother 38   • Uterine cancer Neg Hx    • Cervical cancer Neg Hx    • Colon cancer Neg Hx    • Stomach cancer Neg Hx    • Skin cancer Neg Hx        Home Medications:  Prior to Admission medications    Medication Sig Start Date End Date Taking? Authorizing Provider   albuterol sulfate  (90 Base) MCG/ACT inhaler Every 6 (Six) Hours. 12/14/22   Provider, MD Etienne   atorvastatin (LIPITOR) 10 MG tablet Take 1 tablet by mouth  Daily. 23   Etienne Lamar MD   Cholecalciferol (Vitamin D3) 50 MCG (2000 UT) tablet Take 2 tablets by mouth Daily. 3/28/23   Etienne Lamar MD   Desvenlafaxine Succinate ER 25 MG tablet sustained-release 24 hour Take 1 tablet by mouth Daily.    Etienne Lamar MD   diphenhydrAMINE (BENADRYL) 25 mg capsule Take  by mouth Every 6 (Six) Hours As Needed for Itching.    Emergency, Nurse Angie, RN   HYDROcodone-acetaminophen (HYCET) 7.5-325 MG/15ML solution Take 15 mL by mouth Every 6 (Six) Hours As Needed for Moderate Pain. 23   Wilner Montero MD   levocetirizine (XYZAL) 5 MG tablet Take 1 tablet by mouth Every Evening.    Etienne Lamar MD   OLANZapine (zyPREXA) 10 MG tablet Take 1 tablet by mouth Every Night.    Etienne Lamar MD   omeprazole (priLOSEC) 20 MG capsule Take 1 capsule by mouth Daily. 23   Etienne Lamar MD   triamcinolone (KENALOG) 0.1 % cream triamcinolone acetonide 0.1 % topical cream   APPLY A THIN LAYER TO THE AFFECTED AREA(S) BY TOPICAL ROUTE 2 TIMES PER DAY    Etienne Lamar MD   ziprasidone (GEODON) 40 MG capsule Take 1 capsule by mouth 2 (Two) Times a Day With Meals.    Etienne Lamar MD        Social History:   Social History     Tobacco Use   • Smoking status: Former     Packs/day: 1.00     Types: Cigarettes     Quit date:      Years since quittin.4     Passive exposure: Never   • Smokeless tobacco: Never   Vaping Use   • Vaping Use: Never used   Substance Use Topics   • Alcohol use: Yes     Comment: rare   • Drug use: Not Currently     Types: Methamphetamines, Heroin     Comment: off 3 yrs         Review of Systems:  Review of Systems   Constitutional: Negative for chills and fever.   HENT: Negative for congestion, rhinorrhea and sore throat.    Eyes: Negative for pain and visual disturbance.   Respiratory: Negative for apnea, cough, chest tightness and shortness of breath.    Cardiovascular: Negative for chest pain  "and palpitations.   Gastrointestinal: Negative for abdominal pain, diarrhea, nausea and vomiting.   Genitourinary: Negative for difficulty urinating and dysuria.   Musculoskeletal: Negative for joint swelling and myalgias.   Skin: Negative for color change.   Neurological: Negative for seizures and headaches.   Psychiatric/Behavioral: Negative.    All other systems reviewed and are negative.       Physical Exam:  /68 (BP Location: Right arm, Patient Position: Sitting)   Pulse 101   Temp 97 °F (36.1 °C) (Oral)   Resp 18   Ht 172.7 cm (68\")   Wt 126 kg (278 lb 7.1 oz)   SpO2 97%   BMI 42.34 kg/m²     Physical Exam  Vitals and nursing note reviewed.   Constitutional:       General: She is not in acute distress.     Appearance: Normal appearance. She is not toxic-appearing.   HENT:      Head: Normocephalic and atraumatic.      Jaw: There is normal jaw occlusion.      Mouth/Throat:      Comments: Expected postoperative changes in the oropharynx and tonsillar beds.  No active bleeding or oozing identified at this time.  Eyes:      General: Lids are normal.      Extraocular Movements: Extraocular movements intact.      Conjunctiva/sclera: Conjunctivae normal.      Pupils: Pupils are equal, round, and reactive to light.   Cardiovascular:      Rate and Rhythm: Normal rate and regular rhythm.      Pulses: Normal pulses.      Heart sounds: Normal heart sounds.   Pulmonary:      Effort: Pulmonary effort is normal. No respiratory distress.      Breath sounds: Normal breath sounds. No wheezing or rhonchi.   Abdominal:      General: Abdomen is flat.      Palpations: Abdomen is soft.      Tenderness: There is no abdominal tenderness. There is no guarding or rebound.   Musculoskeletal:         General: Normal range of motion.      Cervical back: Normal range of motion and neck supple.      Right lower leg: No edema.      Left lower leg: No edema.   Skin:     General: Skin is warm and dry.   Neurological:      Mental " Status: She is alert and oriented to person, place, and time. Mental status is at baseline.   Psychiatric:         Mood and Affect: Mood normal.                  Procedures:  Procedures      Medical Decision Making:      Comorbidities that affect care:    Lupus, DEVIN    External Notes reviewed:    Previous Clinic Note: ENT, prep for tonsillectomy surgery      The following orders were placed and all results were independently analyzed by me:  Orders Placed This Encounter   Procedures   • Hemoglobin & Hematocrit, Blood       Medications Given in the Emergency Department:  Medications - No data to display     ED Course:         Labs:    Lab Results (last 24 hours)     Procedure Component Value Units Date/Time    Hemoglobin & Hematocrit, Blood [[id number]]  (Normal) Collected: 05/27/23 2229    Specimen: Blood Updated: 05/27/23 2239     Hemoglobin 12.5 g/dL      Hematocrit 35.0 %            Imaging:    No Radiology Exams Resulted Within Past 24 Hours      Differential Diagnosis and Discussion:    Sore Throat: Differential diagnosis includes but is not limited to bacterial infection, viral infection, inhaled irritants, sinus drainage, thyroiditis, epiglottitis, and retropharyngeal abscess.    All labs were reviewed and interpreted by me.    MDM  Number of Diagnoses or Management Options  Postoperative pain  Diagnosis management comments: In summary this is a 37-year-old female who presents to the emergency department for evaluation of bleeding from my postop tonsillectomy yesterday.  On clinical examination there is no active bleeding identified.  Hemoglobin hematocrit laboratory evaluation was performed and was stable and normal limits.  Discussed with patient's operating surgeon who recommends that given no active bleeding, will stable laboratory evaluation patient is safe for discharge home and outpatient follow-up.  Very strict return to ER and follow-up instructions have been provided to the patient.              Patient Care Considerations:    CT neck however there is no active bleeding on examination, H&H is stable and normal.      Consultants/Shared Management Plan:    Consultant: I have discussed the case with Dr. Montero who states If H&H stable and no active bleeding patient is safe for discharge home and outpatient follow-up.    Social Determinants of Health:    Patient is independent, reliable, and has access to care.       Disposition and Care Coordination:    Discharged: The patient is suitable and stable for discharge with no need for consideration of observation or admission.    I have explained the patient´s condition, diagnoses and treatment plan based on the information available to me at this time. I have answered questions and addressed any concerns. The patient has a good  understanding of the patient´s diagnosis, condition, and treatment plan as can be expected at this point. The vital signs have been stable. The patient´s condition is stable and appropriate for discharge from the emergency department.      The patient will pursue further outpatient evaluation with the primary care physician or other designated or consulting physician as outlined in the discharge instructions. They are agreeable to this plan of care and follow-up instructions have been explained in detail. The patient has received these instructions in written format and have expressed an understanding of the discharge instructions. The patient is aware that any significant change in condition or worsening of symptoms should prompt an immediate return to this or the closest emergency department or call to 911.  I have explained discharge medications and the need for follow up with the patient/caretakers. This was also printed in the discharge instructions. Patient was discharged with the following medications and follow up:      Medication List      No changes were made to your prescriptions during this visit.      Wilner Montero,  MD  [address] [place] [id number]  [phone]      As scheduled       Final diagnoses:   Postoperative pain        ED Disposition     ED Disposition   Discharge    Condition   Stable    Comment   --             This medical record created using voice recognition software.           Cyrus Moore MD  05/28/23 0045

## 2023-05-30 LAB
CYTO UR: NORMAL
LAB AP CASE REPORT: NORMAL
LAB AP CLINICAL INFORMATION: NORMAL
PATH REPORT.FINAL DX SPEC: NORMAL
PATH REPORT.GROSS SPEC: NORMAL

## 2023-07-26 ENCOUNTER — TELEPHONE (OUTPATIENT)
Dept: OBSTETRICS AND GYNECOLOGY | Facility: CLINIC | Age: 38
End: 2023-07-26

## 2023-10-02 ENCOUNTER — TELEPHONE (OUTPATIENT)
Dept: ORTHOPEDIC SURGERY | Facility: CLINIC | Age: 38
End: 2023-10-02
Payer: COMMERCIAL

## 2023-10-02 NOTE — TELEPHONE ENCOUNTER
LEFT TIB/FIB. RCV'D AND INDEXED- PHYSICIAN LETTER 9.12.23, ED NOTES 6.2.21, XR TIBIA & FIBULA 2V LT 6.29.21, 7.20.21. PT DID HAVE SURGERY IN [place].

## 2023-10-30 ENCOUNTER — OFFICE VISIT (OUTPATIENT)
Dept: ORTHOPEDIC SURGERY | Facility: CLINIC | Age: 38
End: 2023-10-30
Payer: COMMERCIAL

## 2023-10-30 VITALS
HEIGHT: 68 IN | SYSTOLIC BLOOD PRESSURE: 129 MMHG | OXYGEN SATURATION: 97 % | WEIGHT: 293 LBS | HEART RATE: 90 BPM | DIASTOLIC BLOOD PRESSURE: 87 MMHG | BODY MASS INDEX: 44.41 KG/M2

## 2023-10-30 DIAGNOSIS — M67.02 CONTRACTURE OF LEFT ACHILLES TENDON: ICD-10-CM

## 2023-10-30 DIAGNOSIS — M79.605 LEFT LEG PAIN: Primary | ICD-10-CM

## 2023-10-30 PROCEDURE — 99203 OFFICE O/P NEW LOW 30 MIN: CPT | Performed by: STUDENT IN AN ORGANIZED HEALTH CARE EDUCATION/TRAINING PROGRAM

## 2023-10-30 PROCEDURE — 1160F RVW MEDS BY RX/DR IN RCRD: CPT | Performed by: STUDENT IN AN ORGANIZED HEALTH CARE EDUCATION/TRAINING PROGRAM

## 2023-10-30 PROCEDURE — 1159F MED LIST DOCD IN RCRD: CPT | Performed by: STUDENT IN AN ORGANIZED HEALTH CARE EDUCATION/TRAINING PROGRAM
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
## 2023-12-06 ENCOUNTER — OFFICE VISIT (OUTPATIENT)
Dept: PODIATRY | Facility: CLINIC | Age: 38
End: 2023-12-06
Payer: COMMERCIAL

## 2023-12-06 VITALS
TEMPERATURE: 97.5 F | OXYGEN SATURATION: 97 % | WEIGHT: 293 LBS | DIASTOLIC BLOOD PRESSURE: 81 MMHG | HEIGHT: 68 IN | BODY MASS INDEX: 44.41 KG/M2 | HEART RATE: 97 BPM | SYSTOLIC BLOOD PRESSURE: 121 MMHG

## 2023-12-06 DIAGNOSIS — M24.573 EQUINUS CONTRACTURE OF ANKLE: Primary | ICD-10-CM

## 2023-12-06 RX ORDER — ZIPRASIDONE HYDROCHLORIDE 60 MG/1
60 CAPSULE ORAL 2 TIMES DAILY WITH MEALS
COMMUNITY
Start: 2023-11-08

## 2023-12-06 RX ORDER — SODIUM CHLORIDE 0.9 % (FLUSH) 0.9 %
10 SYRINGE (ML) INJECTION AS NEEDED
OUTPATIENT
Start: 2023-12-06

## 2023-12-06 RX ORDER — SODIUM CHLORIDE 0.9 % (FLUSH) 0.9 %
10 SYRINGE (ML) INJECTION EVERY 12 HOURS SCHEDULED
OUTPATIENT
Start: 2023-12-06

## 2023-12-06 RX ORDER — SODIUM CHLORIDE, SODIUM LACTATE, POTASSIUM CHLORIDE, CALCIUM CHLORIDE 600; 310; 30; 20 MG/100ML; MG/100ML; MG/100ML; MG/100ML
100 INJECTION, SOLUTION INTRAVENOUS CONTINUOUS
OUTPATIENT
Start: 2023-12-06

## 2023-12-06 RX ORDER — OLANZAPINE 10 MG/1
TABLET, ORALLY DISINTEGRATING ORAL
COMMUNITY
Start: 2023-11-25

## 2023-12-06 RX ORDER — ALPRAZOLAM 0.5 MG/1
0.5 TABLET ORAL 3 TIMES DAILY PRN
COMMUNITY
Start: 2023-11-08

## 2023-12-06 RX ORDER — SODIUM CHLORIDE 9 MG/ML
40 INJECTION, SOLUTION INTRAVENOUS AS NEEDED
OUTPATIENT
Start: 2023-12-06

## 2023-12-06 NOTE — PROGRESS NOTES
[facility]    Today's Date: 12/06/23    Patient Name: Yandel Dean  MRN: [id number]  CSN: [id number]  PCP: Luz Maria Phillips APRN,   Referring Provider: Hi Michelle MD    SUBJECTIVE     Chief Complaint   Patient presents with    Left Ankle - Establish Care, Pain     Previous surgery, 6/2021  Fracture surgery rods, plate, screws   Constant pain   Saw shamika Vazquez on chart, 10/30/23       HPI: Yandel Dean, a 38 y.o.female, presents to clinic.    Patient is a 38-year-old female presenting with left ankle pain.  Patient states she is unable to put her foot on the ground and has been walking on her toes.  Patient states that this happened after a injury in 2021.  She had a juan placed in her leg.  Patient states she did physical therapy and it did not help.  Past Medical History:   Diagnosis Date    Ankle pain, left     Asthma     USES INHALERS    Bipolar disorder     Depression     Elevated cholesterol     Enlarged tonsils     Hypoglycemia     Lupus     Migraine     PTSD (post-traumatic stress disorder)     Recurrent streptococcal tonsillitis      Past Surgical History:   Procedure Laterality Date    ANKLE SURGERY Left 06/2021    HYSTERECTOMY      LEG SURGERY Left     plate, juan, bolts and screws from knee to ankle    TONSILLECTOMY AND ADENOIDECTOMY Bilateral 05/26/2023    Procedure: TONSILLECTOMY AND NASOPHARYNGOSCOPY;  Surgeon: Wilner Montero MD;  Location: [facility] MAIN OR;  Service: ENT;  Laterality: Bilateral;     Family History   Problem Relation Age of Onset    Breast cancer Maternal Grandmother 35    Ovarian cancer Maternal Grandmother 38    Uterine cancer Neg Hx     Cervical cancer Neg Hx     Colon cancer Neg Hx     Stomach cancer Neg Hx     Skin cancer Neg Hx      Social History     Socioeconomic History    Marital status: Single    Number of children: 3   Tobacco Use    Smoking status: Former     Packs/day: 1     Types: Cigarettes     Quit date: 2012     Years since  quittin.9     Passive exposure: Never    Smokeless tobacco: Never   Vaping Use    Vaping Use: Never used   Substance and Sexual Activity    Alcohol use: Yes     Comment: rare    Drug use: Not Currently     Types: Methamphetamines, Heroin     Comment: off 3 yrs    Sexual activity: Defer     Partners: Male     Birth control/protection: Hysterectomy     Allergies   Allergen Reactions    Penicillins Anaphylaxis    Sulfa Antibiotics Anaphylaxis     Current Outpatient Medications   Medication Sig Dispense Refill    albuterol sulfate  (90 Base) MCG/ACT inhaler Every 6 (Six) Hours.      ALPRAZolam (XANAX) 0.5 MG tablet Take 1 tablet by mouth 3 (Three) Times a Day As Needed. for anxiety      atorvastatin (LIPITOR) 10 MG tablet Take 1 tablet by mouth Daily.      Cholecalciferol (Vitamin D3) 50 MCG (2000 UT) tablet Take 2 tablets by mouth Daily.      Desvenlafaxine Succinate ER 25 MG tablet sustained-release 24 hour Take 1 tablet by mouth Daily.      diphenhydrAMINE (BENADRYL) 25 mg capsule Take  by mouth Every 6 (Six) Hours As Needed for Itching.      levocetirizine (XYZAL) 5 MG tablet Take 1 tablet by mouth Every Evening.      OLANZapine zydis (ZyPREXA) 10 MG disintegrating tablet DISSOLVE 1 TABLET ON THE TONGUE AT BEDTIME AS NEEDED FOR ANXIETY OR AGITATION      omeprazole (priLOSEC) 20 MG capsule Take 1 capsule by mouth Daily.      triamcinolone (KENALOG) 0.1 % cream triamcinolone acetonide 0.1 % topical cream   APPLY A THIN LAYER TO THE AFFECTED AREA(S) BY TOPICAL ROUTE 2 TIMES PER DAY      ziprasidone (GEODON) 60 MG capsule Take 1 capsule by mouth 2 (Two) Times a Day With Meals.      HYDROcodone-acetaminophen (HYCET) 7.5-325 MG/15ML solution Take 15 mL by mouth Every 6 (Six) Hours As Needed for Moderate Pain. (Patient not taking: Reported on 2023) 960 mL 0     No current facility-administered medications for this visit.     Review of Systems   Constitutional: Negative.    HENT: Negative.     Eyes:  Negative.    Respiratory: Negative.     Cardiovascular: Negative.    Gastrointestinal: Negative.    Endocrine: Negative.    Genitourinary: Negative.    Musculoskeletal: Negative.         Left ankle pain   Skin: Negative.    Allergic/Immunologic: Negative.    Neurological: Negative.    Hematological: Negative.    Psychiatric/Behavioral: Negative.     All other systems reviewed and are negative.      OBJECTIVE     Vitals:    12/06/23 0733   BP: 121/81   Pulse: 97   Temp: 97.5 °F (36.4 °C)   SpO2: 97%       WBC   Date Value Ref Range Status   05/24/2023 8.41 3.40 - 10.80 10*3/mm3 Final     RBC   Date Value Ref Range Status   05/24/2023 4.56 3.77 - 5.28 10*6/mm3 Final     Hemoglobin   Date Value Ref Range Status   05/27/2023 12.5 12.0 - 15.9 g/dL Final     Hematocrit   Date Value Ref Range Status   05/27/2023 35.0 34.0 - 46.6 % Final     MCV   Date Value Ref Range Status   05/24/2023 87.7 79.0 - 97.0 fL Final     MCH   Date Value Ref Range Status   05/24/2023 31.1 26.6 - 33.0 pg Final     MCHC   Date Value Ref Range Status   05/24/2023 35.5 31.5 - 35.7 g/dL Final     RDW   Date Value Ref Range Status   05/24/2023 11.5 (L) 12.3 - 15.4 % Final     RDW-SD   Date Value Ref Range Status   05/24/2023 37.4 37.0 - 54.0 fl Final     MPV   Date Value Ref Range Status   05/24/2023 11.5 6.0 - 12.0 fL Final     Platelets   Date Value Ref Range Status   05/24/2023 312 140 - 450 10*3/mm3 Final     Neutrophil %   Date Value Ref Range Status   05/24/2023 60.5 42.7 - 76.0 % Final     Lymphocyte %   Date Value Ref Range Status   05/24/2023 30.6 19.6 - 45.3 % Final     Monocyte %   Date Value Ref Range Status   05/24/2023 6.4 5.0 - 12.0 % Final     Eosinophil %   Date Value Ref Range Status   05/24/2023 1.2 0.3 - 6.2 % Final     Basophil %   Date Value Ref Range Status   05/24/2023 0.8 0.0 - 1.5 % Final     Immature Grans %   Date Value Ref Range Status   05/24/2023 0.5 0.0 - 0.5 % Final     Neutrophils Absolute   Date Value Ref Range  "Status   09/29/2021 10.2 (H) 1.5 - 7.1 x10(3)/ul Final     Neutrophils, Absolute   Date Value Ref Range Status   05/24/2023 5.09 1.70 - 7.00 10*3/mm3 Final     Lymphocytes, Absolute   Date Value Ref Range Status   05/24/2023 2.57 0.70 - 3.10 10*3/mm3 Final     Monocytes, Absolute   Date Value Ref Range Status   05/24/2023 0.54 0.10 - 0.90 10*3/mm3 Final     Eosinophils Absolute   Date Value Ref Range Status   09/29/2021 0.1 0.0 - 0.7 x10(3)/ul Final     Eosinophils, Absolute   Date Value Ref Range Status   05/24/2023 0.10 0.00 - 0.40 10*3/mm3 Final     Basophils Absolute   Date Value Ref Range Status   09/29/2021 0.1 0.0 - 0.3 x10(3)/ul Final     Basophils, Absolute   Date Value Ref Range Status   05/24/2023 0.07 0.00 - 0.20 10*3/mm3 Final     Immature Grans, Absolute   Date Value Ref Range Status   05/24/2023 0.04 0.00 - 0.05 10*3/mm3 Final     nRBC   Date Value Ref Range Status   05/24/2023 0.0 0.0 - 0.2 /100 WBC Final         No results found for: \"GLUCOSE\", \"BUN\", \"CREATININE\", \"EGFRIFNONA\", \"EGFRIFAFRI\", \"BCR\", \"K\", \"CO2\", \"CALCIUM\", \"PROTENTOTREF\", \"ALBUMIN\", \"LABIL2\", \"BILIRUBIN\", \"AST\", \"ALT\"    Patient seen in no apparent distress.      PHYSICAL EXAM:     Foot/Ankle Exam    GENERAL  Appearance:  appears stated age  Orientation:  AAOx3  Affect:  appropriate  Gait:  unimpaired  Assistance:  independent  Right shoe gear: casual shoe  Left shoe gear: casual shoe    VASCULAR     Right Foot Vascularity   Normal vascular exam    Dorsalis pedis:  2+  Posterior tibial:  2+  Skin temperature:  warm  Edema grading:  None  CFT:  < 3 seconds  Pedal hair growth:  Present  Varicosities:  none     Left Foot Vascularity   Normal vascular exam    Dorsalis pedis:  2+  Posterior tibial:  2+  Skin temperature:  warm  Edema grading:  None  CFT:  < 3 seconds  Pedal hair growth:  Present  Varicosities:  none     NEUROLOGIC     Right Foot Neurologic   Normal sensation    Light touch sensation: normal  Vibratory sensation: " normal  Hot/Cold sensation: normal  Protective Sensation using [place]-Pavel Monofilament:   Sites intact: 10  Sites tested: 10     Left Foot Neurologic   Normal sensation    Light touch sensation: normal  Vibratory sensation: normal  Hot/Cold sensation:  normal  Protective Sensation using [place]-Pavel Monofilament:   Sites intact: 10  Sites tested: 10    MUSCULOSKELETAL     Left Foot Musculoskeletal   Tenderness:  retrocalcaneal bursa tenderness    MUSCLE STRENGTH     Right Foot Muscle Strength   Foot dorsiflexion:  4  Foot plantar flexion:  4  Foot inversion:  4  Foot eversion:  4     Left Foot Muscle Strength   Foot dorsiflexion:  4  Foot plantar flexion:  4  Foot inversion:  4  Foot eversion:  4    RANGE OF MOTION     Right Foot Range of Motion   Foot and ankle ROM within normal limits       Left Foot Range of Motion   Foot and ankle ROM within normal limits    Ankle dorsiflexion: decreased with pain    DERMATOLOGIC      Right Foot Dermatologic   Skin  Right foot skin is intact.      Left Foot Dermatologic   Skin  Left foot skin is intact.       RADIOLOGY:        No results found.    ASSESSMENT/PLAN     Diagnoses and all orders for this visit:    1. Equinus contracture of ankle (Primary)  -     Case Request; Standing  -     sodium chloride 0.9 % flush 10 mL  -     sodium chloride 0.9 % flush 10 mL  -     sodium chloride 0.9 % infusion 40 mL  -     lactated ringers infusion  -     clindamycin (CLEOCIN) 900 mg in dextrose (D5W) 5 % 100 mL IVPB  -     Case Request    Other orders  -     Follow Anesthesia Guidelines / Protocol; Future  -     Follow Anesthesia Guidelines / Protocol; Standing  -     Verify / Perform Chlorhexidine Skin Prep; Standing  -     Verify / Perform Chlorhexidine Skin Prep if Indicated (If Not Already Completed); Standing  -     Obtain Informed Consent; Future  -     Provide NPO Instructions to Patient; Future  -     Chlorhexidine Skin Prep; Future  -     Insert Peripheral IV;  Standing  -     Saline Lock & Maintain IV Access; Standing      Patient for Achilles lengthening versus gastroc recession.  Has failed physical therapy.    The risks and benefits of the surgery were discussed with the patient.  This discussion included possible complications of requiring further surgery, possible delayed wound healing, further surgery requiring amputation of the foot or leg, and also included the complication of death.  All the patient's questions were answered to their satisfaction.  Patient states they would like to proceed with the procedure.    Comprehensive lower extremity examination and evaluation was performed.    Discussed findings and treatment plan including risks, benefits, and treatment options with patient in detail. Patient agreed with treatment plan.    Medications and allergies reviewed.  Reviewed available lab values along with other pertinent labs.  These were discussed with the patient.    An After Visit Summary was printed and given to the patient at discharge, including (if requested) any available informative/educational handouts regarding diagnosis, treatment, or medications. All questions were answered to patient/family satisfaction. Should symptoms fail to improve or worsen they agree to call or return to clinic or to go to the Emergency Department. Discussed the importance of following up with any needed screening tests/labs/specialist appointments and any requested follow-up recommended by me today. Importance of maintaining follow-up discussed and patient accepts that missed appointments can delay diagnosis and potentially lead to worsening of conditions.    Return for Post op., or sooner if acute issues arise.    This document has been electronically signed by Adriel Fitzgerald DPM on December 6, 2023 08:27 EST

## 2024-01-12 NOTE — PRE-PROCEDURE INSTRUCTIONS
PATIENT INSTRUCTED TO BE:    - NPO AFTER MIDNIGHT EXCEPT CAN HAVE CLEAR LIQUIDS 2 HOURS PRIOR TO SURGERY ARRIVAL TIME     - TO HOLD ALL VITAMINS, SUPPLEMENTS, NSAIDS FOR ONE WEEK PRIOR TO THEIR SURGICAL PROCEDURE    - INSTRUCTED PT TO USE SURGICAL SOAP 1 TIME THE NIGHT PRIOR TO SURGERY OR THE AM OF SURGERY.   USE SOAP FROM NECK TO TOES AVOID THEIR FACE, HAIR, AND PRIVATE PARTS. INSTRUCTED NO LOTIONS, JEWELRY, PIERCINGS, OR DEODORANT DAY OF SURGERY        - INSTRUCTED TO TAKE THE FOLLOWING MEDICATIONS THE DAY OF SURGERY:   Xanax, Effexor, Geodon  If needed Albuterol inhaler      PATIENT VERBALIZED UNDERSTANDING

## 2024-01-16 ENCOUNTER — ANESTHESIA (OUTPATIENT)
Dept: PERIOP | Facility: HOSPITAL | Age: 39
End: 2024-01-16
Payer: COMMERCIAL

## 2024-01-16 ENCOUNTER — HOSPITAL ENCOUNTER (OUTPATIENT)
Facility: HOSPITAL | Age: 39
Setting detail: HOSPITAL OUTPATIENT SURGERY
Discharge: HOME | End: 2024-01-16
Attending: PODIATRIST | Admitting: PODIATRIST
Payer: COMMERCIAL

## 2024-01-16 ENCOUNTER — ANESTHESIA EVENT (OUTPATIENT)
Dept: PERIOP | Facility: HOSPITAL | Age: 39
End: 2024-01-16
Payer: COMMERCIAL

## 2024-01-16 VITALS
RESPIRATION RATE: 20 BRPM | OXYGEN SATURATION: 97 % | SYSTOLIC BLOOD PRESSURE: 112 MMHG | HEIGHT: 68 IN | TEMPERATURE: 97.9 F | BODY MASS INDEX: 44.41 KG/M2 | HEART RATE: 73 BPM | DIASTOLIC BLOOD PRESSURE: 71 MMHG | WEIGHT: 293 LBS

## 2024-01-16 DIAGNOSIS — M24.573 EQUINUS CONTRACTURE OF ANKLE: Primary | ICD-10-CM

## 2024-01-16 PROCEDURE — 25010000002 BUPIVACAINE (PF) 0.5 % SOLUTION 10 ML VIAL: Performed by: PODIATRIST

## 2024-01-16 PROCEDURE — 25010000002 DEXAMETHASONE PER 1 MG: Performed by: NURSE ANESTHETIST, CERTIFIED REGISTERED

## 2024-01-16 PROCEDURE — 25010000002 FENTANYL CITRATE (PF) 50 MCG/ML SOLUTION: Performed by: NURSE ANESTHETIST, CERTIFIED REGISTERED

## 2024-01-16 PROCEDURE — 25810000003 LACTATED RINGERS PER 1000 ML: Performed by: ANESTHESIOLOGY

## 2024-01-16 PROCEDURE — 25010000002 CLINDAMYCIN 900 MG/50ML SOLUTION: Performed by: PODIATRIST

## 2024-01-16 PROCEDURE — 25010000002 ONDANSETRON PER 1 MG: Performed by: NURSE ANESTHETIST, CERTIFIED REGISTERED

## 2024-01-16 PROCEDURE — 25010000002 MIDAZOLAM PER 1MG: Performed by: ANESTHESIOLOGY

## 2024-01-16 PROCEDURE — 25010000002 PROPOFOL 10 MG/ML EMULSION: Performed by: NURSE ANESTHETIST, CERTIFIED REGISTERED

## 2024-01-16 PROCEDURE — 25010000002 METOCLOPRAMIDE PER 10 MG: Performed by: ANESTHESIOLOGY

## 2024-01-16 PROCEDURE — 25010000002 SUGAMMADEX 200 MG/2ML SOLUTION: Performed by: NURSE ANESTHETIST, CERTIFIED REGISTERED

## 2024-01-16 PROCEDURE — 25010000002 LIDOCAINE 1 % SOLUTION 10 ML VIAL: Performed by: PODIATRIST

## 2024-01-16 RX ORDER — LIDOCAINE HYDROCHLORIDE 20 MG/ML
INJECTION, SOLUTION EPIDURAL; INFILTRATION; INTRACAUDAL; PERINEURAL AS NEEDED
Status: DISCONTINUED | OUTPATIENT
Start: 2024-01-16 | End: 2024-01-16 | Stop reason: SURG

## 2024-01-16 RX ORDER — HYDROCODONE BITARTRATE AND ACETAMINOPHEN 7.5; 325 MG/1; MG/1
1 TABLET ORAL EVERY 6 HOURS PRN
Qty: 30 TABLET | Refills: 0 | Status: SHIPPED | OUTPATIENT
Start: 2024-01-16

## 2024-01-16 RX ORDER — PROMETHAZINE HYDROCHLORIDE 25 MG/1
25 SUPPOSITORY RECTAL ONCE AS NEEDED
Status: DISCONTINUED | OUTPATIENT
Start: 2024-01-16 | End: 2024-01-16 | Stop reason: HOSPADM

## 2024-01-16 RX ORDER — PROMETHAZINE HYDROCHLORIDE 12.5 MG/1
25 TABLET ORAL ONCE AS NEEDED
Status: DISCONTINUED | OUTPATIENT
Start: 2024-01-16 | End: 2024-01-16 | Stop reason: HOSPADM

## 2024-01-16 RX ORDER — MAGNESIUM HYDROXIDE 1200 MG/15ML
LIQUID ORAL AS NEEDED
Status: DISCONTINUED | OUTPATIENT
Start: 2024-01-16 | End: 2024-01-16 | Stop reason: HOSPADM

## 2024-01-16 RX ORDER — PROMETHAZINE HYDROCHLORIDE 25 MG/1
25 TABLET ORAL EVERY 6 HOURS PRN
Qty: 20 TABLET | Refills: 0 | Status: SHIPPED | OUTPATIENT
Start: 2024-01-16

## 2024-01-16 RX ORDER — SODIUM CHLORIDE 9 MG/ML
40 INJECTION, SOLUTION INTRAVENOUS AS NEEDED
Status: DISCONTINUED | OUTPATIENT
Start: 2024-01-16 | End: 2024-01-16 | Stop reason: HOSPADM

## 2024-01-16 RX ORDER — MIDAZOLAM HYDROCHLORIDE 2 MG/2ML
2 INJECTION, SOLUTION INTRAMUSCULAR; INTRAVENOUS ONCE
Status: COMPLETED | OUTPATIENT
Start: 2024-01-16 | End: 2024-01-16

## 2024-01-16 RX ORDER — SODIUM CHLORIDE, SODIUM LACTATE, POTASSIUM CHLORIDE, CALCIUM CHLORIDE 600; 310; 30; 20 MG/100ML; MG/100ML; MG/100ML; MG/100ML
9 INJECTION, SOLUTION INTRAVENOUS CONTINUOUS PRN
Status: DISCONTINUED | OUTPATIENT
Start: 2024-01-16 | End: 2024-01-16 | Stop reason: HOSPADM

## 2024-01-16 RX ORDER — ONDANSETRON 2 MG/ML
4 INJECTION INTRAMUSCULAR; INTRAVENOUS ONCE AS NEEDED
Status: DISCONTINUED | OUTPATIENT
Start: 2024-01-16 | End: 2024-01-16 | Stop reason: HOSPADM

## 2024-01-16 RX ORDER — OXYCODONE HYDROCHLORIDE 5 MG/1
5 TABLET ORAL
Status: DISCONTINUED | OUTPATIENT
Start: 2024-01-16 | End: 2024-01-16 | Stop reason: HOSPADM

## 2024-01-16 RX ORDER — ACETAMINOPHEN 500 MG
1000 TABLET ORAL ONCE
Status: COMPLETED | OUTPATIENT
Start: 2024-01-16 | End: 2024-01-16

## 2024-01-16 RX ORDER — ONDANSETRON 2 MG/ML
INJECTION INTRAMUSCULAR; INTRAVENOUS AS NEEDED
Status: DISCONTINUED | OUTPATIENT
Start: 2024-01-16 | End: 2024-01-16 | Stop reason: SURG

## 2024-01-16 RX ORDER — SODIUM CHLORIDE, SODIUM LACTATE, POTASSIUM CHLORIDE, CALCIUM CHLORIDE 600; 310; 30; 20 MG/100ML; MG/100ML; MG/100ML; MG/100ML
100 INJECTION, SOLUTION INTRAVENOUS CONTINUOUS
Status: DISCONTINUED | OUTPATIENT
Start: 2024-01-16 | End: 2024-01-16 | Stop reason: HOSPADM

## 2024-01-16 RX ORDER — SODIUM CHLORIDE 0.9 % (FLUSH) 0.9 %
10 SYRINGE (ML) INJECTION EVERY 12 HOURS SCHEDULED
Status: DISCONTINUED | OUTPATIENT
Start: 2024-01-16 | End: 2024-01-16 | Stop reason: HOSPADM

## 2024-01-16 RX ORDER — DEXAMETHASONE SODIUM PHOSPHATE 4 MG/ML
INJECTION, SOLUTION INTRA-ARTICULAR; INTRALESIONAL; INTRAMUSCULAR; INTRAVENOUS; SOFT TISSUE AS NEEDED
Status: DISCONTINUED | OUTPATIENT
Start: 2024-01-16 | End: 2024-01-16 | Stop reason: SURG

## 2024-01-16 RX ORDER — CLINDAMYCIN PHOSPHATE 900 MG/50ML
900 INJECTION, SOLUTION INTRAVENOUS ONCE
Status: COMPLETED | OUTPATIENT
Start: 2024-01-16 | End: 2024-01-16

## 2024-01-16 RX ORDER — PROPOFOL 10 MG/ML
VIAL (ML) INTRAVENOUS AS NEEDED
Status: DISCONTINUED | OUTPATIENT
Start: 2024-01-16 | End: 2024-01-16 | Stop reason: SURG

## 2024-01-16 RX ORDER — METOCLOPRAMIDE HYDROCHLORIDE 5 MG/ML
10 INJECTION INTRAMUSCULAR; INTRAVENOUS ONCE AS NEEDED
Status: COMPLETED | OUTPATIENT
Start: 2024-01-16 | End: 2024-01-16

## 2024-01-16 RX ORDER — ROCURONIUM BROMIDE 10 MG/ML
INJECTION, SOLUTION INTRAVENOUS AS NEEDED
Status: DISCONTINUED | OUTPATIENT
Start: 2024-01-16 | End: 2024-01-16 | Stop reason: SURG

## 2024-01-16 RX ORDER — SUCCINYLCHOLINE/SOD CL,ISO/PF 100 MG/5ML
SYRINGE (ML) INTRAVENOUS AS NEEDED
Status: DISCONTINUED | OUTPATIENT
Start: 2024-01-16 | End: 2024-01-16 | Stop reason: SURG

## 2024-01-16 RX ORDER — FENTANYL CITRATE 50 UG/ML
INJECTION, SOLUTION INTRAMUSCULAR; INTRAVENOUS AS NEEDED
Status: DISCONTINUED | OUTPATIENT
Start: 2024-01-16 | End: 2024-01-16 | Stop reason: SURG

## 2024-01-16 RX ORDER — SODIUM CHLORIDE 0.9 % (FLUSH) 0.9 %
10 SYRINGE (ML) INJECTION AS NEEDED
Status: DISCONTINUED | OUTPATIENT
Start: 2024-01-16 | End: 2024-01-16 | Stop reason: HOSPADM

## 2024-01-16 RX ORDER — MEPERIDINE HYDROCHLORIDE 25 MG/ML
12.5 INJECTION INTRAMUSCULAR; INTRAVENOUS; SUBCUTANEOUS
Status: DISCONTINUED | OUTPATIENT
Start: 2024-01-16 | End: 2024-01-16 | Stop reason: HOSPADM

## 2024-01-16 RX ORDER — DEXMEDETOMIDINE HYDROCHLORIDE 100 UG/ML
INJECTION, SOLUTION INTRAVENOUS AS NEEDED
Status: DISCONTINUED | OUTPATIENT
Start: 2024-01-16 | End: 2024-01-16 | Stop reason: SURG

## 2024-01-16 RX ADMIN — METOCLOPRAMIDE HYDROCHLORIDE 10 MG: 5 INJECTION INTRAMUSCULAR; INTRAVENOUS at 11:36

## 2024-01-16 RX ADMIN — SUGAMMADEX 300 MG: 100 INJECTION, SOLUTION INTRAVENOUS at 12:21

## 2024-01-16 RX ADMIN — DEXMEDETOMIDINE HYDROCHLORIDE 5 MCG: 100 INJECTION, SOLUTION, CONCENTRATE INTRAVENOUS at 12:02

## 2024-01-16 RX ADMIN — DEXMEDETOMIDINE HYDROCHLORIDE 5 MCG: 100 INJECTION, SOLUTION, CONCENTRATE INTRAVENOUS at 12:19

## 2024-01-16 RX ADMIN — DEXMEDETOMIDINE HYDROCHLORIDE 15 MCG: 100 INJECTION, SOLUTION, CONCENTRATE INTRAVENOUS at 12:09

## 2024-01-16 RX ADMIN — ACETAMINOPHEN 1000 MG: 500 TABLET ORAL at 11:32

## 2024-01-16 RX ADMIN — PROPOFOL 250 MG: 10 INJECTION, EMULSION INTRAVENOUS at 11:46

## 2024-01-16 RX ADMIN — CLINDAMYCIN IN 5 PERCENT DEXTROSE 900 MG: 18 INJECTION, SOLUTION INTRAVENOUS at 11:40

## 2024-01-16 RX ADMIN — MIDAZOLAM HYDROCHLORIDE 2 MG: 1 INJECTION, SOLUTION INTRAMUSCULAR; INTRAVENOUS at 11:34

## 2024-01-16 RX ADMIN — ONDANSETRON 4 MG: 2 INJECTION INTRAMUSCULAR; INTRAVENOUS at 12:14

## 2024-01-16 RX ADMIN — OXYCODONE 5 MG: 5 TABLET ORAL at 12:48

## 2024-01-16 RX ADMIN — ROCURONIUM BROMIDE 5 MG: 10 INJECTION, SOLUTION INTRAVENOUS at 11:46

## 2024-01-16 RX ADMIN — ROCURONIUM BROMIDE 25 MG: 10 INJECTION, SOLUTION INTRAVENOUS at 12:00

## 2024-01-16 RX ADMIN — LIDOCAINE HYDROCHLORIDE 100 MG: 20 INJECTION, SOLUTION EPIDURAL; INFILTRATION; INTRACAUDAL; PERINEURAL at 11:46

## 2024-01-16 RX ADMIN — FENTANYL CITRATE 100 MCG: 50 INJECTION, SOLUTION INTRAMUSCULAR; INTRAVENOUS at 11:46

## 2024-01-16 RX ADMIN — ROCURONIUM BROMIDE 20 MG: 10 INJECTION, SOLUTION INTRAVENOUS at 12:12

## 2024-01-16 RX ADMIN — DEXAMETHASONE SODIUM PHOSPHATE 4 MG: 4 INJECTION, SOLUTION INTRAMUSCULAR; INTRAVENOUS at 11:46

## 2024-01-16 RX ADMIN — Medication 150 MG: at 11:46

## 2024-01-16 RX ADMIN — SODIUM CHLORIDE, POTASSIUM CHLORIDE, SODIUM LACTATE AND CALCIUM CHLORIDE 9 ML/HR: 600; 310; 30; 20 INJECTION, SOLUTION INTRAVENOUS at 11:34

## 2024-01-16 NOTE — ANESTHESIA POSTPROCEDURE EVALUATION
Patient: Yandel Dean    Procedure Summary       Date: 01/16/24 Room / Location: [facility] OR 04 / [facility] MAIN OR    Anesthesia Start: 1140 Anesthesia Stop: 1232    Procedure: LEFT ACHILLES TENDON LENGTHENING, LEFT GASTROCNEMIUS LENGTHENING (Left: Leg Lower) Diagnosis:       Equinus contracture of ankle      (Equinus contracture of ankle [M24.573])    Surgeons: Adriel Fitzgerald DPM Provider: Galdino Adams MD    Anesthesia Type: general ASA Status: 3            Anesthesia Type: general    Vitals  Vitals Value Taken Time   /69 01/16/24 1301   Temp 35.9 °C (96.7 °F) 01/16/24 1255   Pulse 75 01/16/24 1303   Resp 14 01/16/24 1300   SpO2 93 % 01/16/24 1303   Vitals shown include unfiled device data.        Post Anesthesia Care and Evaluation    Patient location during evaluation: bedside  Patient participation: complete - patient participated  Level of consciousness: awake  Pain management: adequate    Airway patency: patent  Anesthetic complications: No anesthetic complications  PONV Status: none  Cardiovascular status: acceptable and stable  Respiratory status: acceptable  Hydration status: acceptable    Comments: An Anesthesiologist personally participated in the most demanding procedures (including induction and emergence if applicable) in the anesthesia plan, monitored the course of anesthesia administration at frequent intervals and remained physically present and available for immediate diagnosis and treatment of emergencies.

## 2024-01-16 NOTE — H&P
H&P reviewed. The patient was examined and there are no changes to the H&P.           [facility] PODIATRY    Today's Date: 01/16/24    Patient Name: Yandel Dean  MRN: [id number]  CSN: [id number]  PCP: Luz Maria Phillips APRN,   Referring Provider: Adriel Fitzgerald DPM    SUBJECTIVE     No chief complaint on file.    HPI: Yandel Dean, a 38 y.o.female, presents to clinic.    Patient is a 38-year-old female presenting with left ankle pain.  Patient states she is unable to put her foot on the ground and has been walking on her toes.  Patient states that this happened after a injury in 2021.  She had a juan placed in her leg.  Patient states she did physical therapy and it did not help.  Past Medical History:   Diagnosis Date    Acid reflux     Ankle pain, left     Asthma     USES INHALERS    Bipolar disorder     Depression     Elevated cholesterol     H/O Enlarged tonsils     Hypoglycemia     Lupus     Migraine     PTSD (post-traumatic stress disorder)     no issues waking from anesthesia, would like to be told before she is touch    Recurrent streptococcal tonsillitis      Past Surgical History:   Procedure Laterality Date    ANKLE SURGERY Left 06/2021    HYSTERECTOMY      LEG SURGERY Left     plate, juan, bolts and screws from knee to ankle    TONSILLECTOMY AND ADENOIDECTOMY Bilateral 05/26/2023    Procedure: TONSILLECTOMY AND NASOPHARYNGOSCOPY;  Surgeon: Wilner Montero MD;  Location: [facility] OR;  Service: ENT;  Laterality: Bilateral;     Family History   Problem Relation Age of Onset    Breast cancer Maternal Grandmother 35    Ovarian cancer Maternal Grandmother 38    Uterine cancer Neg Hx     Cervical cancer Neg Hx     Colon cancer Neg Hx     Stomach cancer Neg Hx     Skin cancer Neg Hx      Social History     Socioeconomic History    Marital status: Single    Number of children: 3   Tobacco Use    Smoking status: Former     Packs/day: 1     Types: Cigarettes     Quit date: 2012     Years since  quittin.0     Passive exposure: Never    Smokeless tobacco: Never   Vaping Use    Vaping Use: Never used   Substance and Sexual Activity    Alcohol use: Yes     Comment: rare    Drug use: Not Currently     Types: Methamphetamines, Heroin     Comment: off 3 yrs    Sexual activity: Defer     Partners: Male     Birth control/protection: Hysterectomy     Allergies   Allergen Reactions    Penicillins Anaphylaxis    Sulfa Antibiotics Anaphylaxis     Current Facility-Administered Medications   Medication Dose Route Frequency Provider Last Rate Last Admin    clindamycin (CLEOCIN) 900 mg in dextrose 5% 50 mL IVPB (premix)  900 mg Intravenous Once Rotterman, Adriel T, DPM        lactated ringers infusion  100 mL/hr Intravenous Continuous Rotterman, Adriel T, DPM        sodium chloride 0.9 % flush 10 mL  10 mL Intravenous Q12H Rotterman, Adriel T, DPM        sodium chloride 0.9 % flush 10 mL  10 mL Intravenous PRN Rotterman, Adriel T, DPM        sodium chloride 0.9 % infusion 40 mL  40 mL Intravenous PRN Rotterman, Adriel T, DPM         Review of Systems   Constitutional: Negative.    HENT: Negative.     Eyes: Negative.    Respiratory: Negative.     Cardiovascular: Negative.    Gastrointestinal: Negative.    Endocrine: Negative.    Genitourinary: Negative.    Musculoskeletal: Negative.         Left ankle pain   Skin: Negative.    Allergic/Immunologic: Negative.    Neurological: Negative.    Hematological: Negative.    Psychiatric/Behavioral: Negative.     All other systems reviewed and are negative.      OBJECTIVE     Vitals:    24 0833   BP: 122/79   Pulse: 70   Resp: 18   Temp: 97.2 °F (36.2 °C)   SpO2: 98%       WBC   Date Value Ref Range Status   2023 8.41 3.40 - 10.80 10*3/mm3 Final     RBC   Date Value Ref Range Status   2023 4.56 3.77 - 5.28 10*6/mm3 Final     Hemoglobin   Date Value Ref Range Status   2023 12.5 12.0 - 15.9 g/dL Final     Hematocrit   Date Value Ref Range Status   2023 35.0  34.0 - 46.6 % Final     MCV   Date Value Ref Range Status   05/24/2023 87.7 79.0 - 97.0 fL Final     MCH   Date Value Ref Range Status   05/24/2023 31.1 26.6 - 33.0 pg Final     MCHC   Date Value Ref Range Status   05/24/2023 35.5 31.5 - 35.7 g/dL Final     RDW   Date Value Ref Range Status   05/24/2023 11.5 (L) 12.3 - 15.4 % Final     RDW-SD   Date Value Ref Range Status   05/24/2023 37.4 37.0 - 54.0 fl Final     MPV   Date Value Ref Range Status   05/24/2023 11.5 6.0 - 12.0 fL Final     Platelets   Date Value Ref Range Status   05/24/2023 312 140 - 450 10*3/mm3 Final     Neutrophil %   Date Value Ref Range Status   05/24/2023 60.5 42.7 - 76.0 % Final     Lymphocyte %   Date Value Ref Range Status   05/24/2023 30.6 19.6 - 45.3 % Final     Monocyte %   Date Value Ref Range Status   05/24/2023 6.4 5.0 - 12.0 % Final     Eosinophil %   Date Value Ref Range Status   05/24/2023 1.2 0.3 - 6.2 % Final     Basophil %   Date Value Ref Range Status   05/24/2023 0.8 0.0 - 1.5 % Final     Immature Grans %   Date Value Ref Range Status   05/24/2023 0.5 0.0 - 0.5 % Final     Neutrophils Absolute   Date Value Ref Range Status   09/29/2021 10.2 (H) 1.5 - 7.1 x10(3)/ul Final     Neutrophils, Absolute   Date Value Ref Range Status   05/24/2023 5.09 1.70 - 7.00 10*3/mm3 Final     Lymphocytes, Absolute   Date Value Ref Range Status   05/24/2023 2.57 0.70 - 3.10 10*3/mm3 Final     Monocytes, Absolute   Date Value Ref Range Status   05/24/2023 0.54 0.10 - 0.90 10*3/mm3 Final     Eosinophils Absolute   Date Value Ref Range Status   09/29/2021 0.1 0.0 - 0.7 x10(3)/ul Final     Eosinophils, Absolute   Date Value Ref Range Status   05/24/2023 0.10 0.00 - 0.40 10*3/mm3 Final     Basophils Absolute   Date Value Ref Range Status   09/29/2021 0.1 0.0 - 0.3 x10(3)/ul Final     Basophils, Absolute   Date Value Ref Range Status   05/24/2023 0.07 0.00 - 0.20 10*3/mm3 Final     Immature Grans, Absolute   Date Value Ref Range Status   05/24/2023 0.04  "0.00 - 0.05 10*3/mm3 Final     [facility]   Date Value Ref Range Status   05/24/2023 0.0 0.0 - 0.2 /100 WBC Final         No results found for: \"GLUCOSE\", \"BUN\", \"CREATININE\", \"EGFRIFNONA\", \"EGFRIFAFRI\", \"BCR\", \"K\", \"CO2\", \"CALCIUM\", \"PROTENTOTREF\", \"ALBUMIN\", \"LABIL2\", \"BILIRUBIN\", \"AST\", \"ALT\"    Patient seen in no apparent distress.      PHYSICAL EXAM:     Foot/Ankle Exam    GENERAL  Appearance:  appears stated age  Orientation:  AAOx3  Affect:  appropriate  Gait:  unimpaired  Assistance:  independent  Right shoe gear: casual shoe  Left shoe gear: casual shoe    VASCULAR     Right Foot Vascularity   Normal vascular exam    Dorsalis pedis:  2+  Posterior tibial:  2+  Skin temperature:  warm  Edema grading:  None  CFT:  < 3 seconds  Pedal hair growth:  Present  Varicosities:  none     Left Foot Vascularity   Normal vascular exam    Dorsalis pedis:  2+  Posterior tibial:  2+  Skin temperature:  warm  Edema grading:  None  CFT:  < 3 seconds  Pedal hair growth:  Present  Varicosities:  none     NEUROLOGIC     Right Foot Neurologic   Normal sensation    Light touch sensation: normal  Vibratory sensation: normal  Hot/Cold sensation: normal  Protective Sensation using Baggs-Pavel Monofilament:   Sites intact: 10  Sites tested: 10     Left Foot Neurologic   Normal sensation    Light touch sensation: normal  Vibratory sensation: normal  Hot/Cold sensation:  normal  Protective Sensation using Baggs-Pavel Monofilament:   Sites intact: 10  Sites tested: 10    MUSCULOSKELETAL     Left Foot Musculoskeletal   Tenderness:  retrocalcaneal bursa tenderness    MUSCLE STRENGTH     Right Foot Muscle Strength   Foot dorsiflexion:  4  Foot plantar flexion:  4  Foot inversion:  4  Foot eversion:  4     Left Foot Muscle Strength   Foot dorsiflexion:  4  Foot plantar flexion:  4  Foot inversion:  4  Foot eversion:  4    RANGE OF MOTION     Right Foot Range of Motion   Foot and ankle ROM within normal limits       Left Foot Range of " Motion   Foot and ankle ROM within normal limits    Ankle dorsiflexion: decreased with pain    DERMATOLOGIC      Right Foot Dermatologic   Skin  Right foot skin is intact.      Left Foot Dermatologic   Skin  Left foot skin is intact.       RADIOLOGY:        No results found.    ASSESSMENT/PLAN     Diagnoses and all orders for this visit:    1. Equinus contracture of ankle  -     sodium chloride 0.9 % flush 10 mL  -     sodium chloride 0.9 % flush 10 mL  -     sodium chloride 0.9 % infusion 40 mL  -     lactated ringers infusion  -     clindamycin (CLEOCIN) 900 mg in dextrose 5% 50 mL IVPB (premix)    Other orders  -     Follow Anesthesia Guidelines / Protocol; Standing  -     Verify / Perform Chlorhexidine Skin Prep; Standing  -     Verify / Perform Chlorhexidine Skin Prep if Indicated (If Not Already Completed); Standing  -     Insert Peripheral IV; Standing  -     Saline Lock & Maintain IV Access; Standing  -     Follow Anesthesia Guidelines / Protocol  -     Verify / Perform Chlorhexidine Skin Prep  -     Verify / Perform Chlorhexidine Skin Prep if Indicated (If Not Already Completed)  -     Insert Peripheral IV  -     Saline Lock & Maintain IV Access      Patient for Achilles lengthening versus gastroc recession.  Has failed physical therapy.    The risks and benefits of the surgery were discussed with the patient.  This discussion included possible complications of requiring further surgery, possible delayed wound healing, further surgery requiring amputation of the foot or leg, and also included the complication of death.  All the patient's questions were answered to their satisfaction.  Patient states they would like to proceed with the procedure.    Comprehensive lower extremity examination and evaluation was performed.    Discussed findings and treatment plan including risks, benefits, and treatment options with patient in detail. Patient agreed with treatment plan.    Medications and allergies reviewed.   Reviewed available lab values along with other pertinent labs.  These were discussed with the patient.    An After Visit Summary was printed and given to the patient at discharge, including (if requested) any available informative/educational handouts regarding diagnosis, treatment, or medications. All questions were answered to patient/family satisfaction. Should symptoms fail to improve or worsen they agree to call or return to clinic or to go to the Emergency Department. Discussed the importance of following up with any needed screening tests/labs/specialist appointments and any requested follow-up recommended by me today. Importance of maintaining follow-up discussed and patient accepts that missed appointments can delay diagnosis and potentially lead to worsening of conditions.    No follow-ups on file., or sooner if acute issues arise.    This document has been electronically signed by Adriel Fitzgerald DPM on January 16, 2024 09:24 EST

## 2024-01-16 NOTE — ANESTHESIA PREPROCEDURE EVALUATION
Anesthesia Evaluation     Patient summary reviewed and Nursing notes reviewed   no history of anesthetic complications:   NPO Solid Status: > 8 hours  NPO Liquid Status: > 2 hours           Airway   Mallampati: II  TM distance: >3 FB  Neck ROM: full  No difficulty expected  Dental      Pulmonary - normal exam    breath sounds clear to auscultation  (+) asthma,recent URI, sleep apnea  Cardiovascular - normal exam  Exercise tolerance: good (4-7 METS)    Rhythm: regular  Rate: normal    (+) hyperlipidemia      Neuro/Psych  (+) headaches, psychiatric history  GI/Hepatic/Renal/Endo    (+) GERD    Musculoskeletal (-) negative ROS    Abdominal    Substance History - negative use     OB/GYN negative ob/gyn ROS         Other - negative ROS       ROS/Med Hx Other: PAT Nursing Notes unavailable.               Anesthesia Plan    ASA 3     general     (Patient understands anesthesia not responsible for dental damage.)  intravenous induction     Anesthetic plan, risks, benefits, and alternatives have been provided, discussed and informed consent has been obtained with: patient.    Use of blood products discussed with patient .    Plan discussed with CRNA.    CODE STATUS:

## 2024-01-16 NOTE — OP NOTE
Operative Note   Foot and Ankle Surgery   Provider: Dr. Adriel Fitzgerald DPM  Location: [facility]    Patient Name:  Yandel Dean  YOB: 1985    Date of Surgery:  1/16/2024    Pre-op Diagnosis:   Equinus contracture of ankle [M24.573]       Post-Op Diagnosis Codes:     * Equinus contracture of ankle [M24.573]    Procedure/CPT® Codes:  ID GASTROCNEMIUS RECESSION [82004]    Procedure(s):  LEFT ACHILLES TENDON LENGTHENING, LEFT GASTROCNEMIUS LENGTHENING        Staff:  Surgeon(s):  Adriel Fitzgerald DPM    Assistant: Amy Escalante RN CSA  was responsible for performing the following activities: Retraction, Suction, and Irrigation and their skilled assistance was necessary for the success of this case.    Anesthesia: General    Estimated Blood Loss: less than 5 ml    Implants:    Nothing was implanted during the procedure    Specimen:          None    Findings: None    Complications: none    Description of Procedure:     Procedure, risks, complications, and goals were discussed with the patient at bedside.  Risks include but are not limited to infection, complications from anesthesia (including death), chronic pain or numbness, hematoma/seroma, deep vein thrombosis, wound complications, and potential for additional surgical procedures.  Patient understands and elects to proceed with surgery at this time. Informed consent was obtained before proceeding to the operating suite.  All questions were answered to the patient's satisfaction. No guarantees or assurances were given or implied.    Left foot was prepped and draped in usual sterile manner.  Left pneumatic thigh tourniquet was inflated to 300 mmHg.  Attention was directed to the medial aspect of the muscle tendinous junction of the Achilles tendon.  Incision was made deepened through subcutaneous tissue down to fascia keeping all neurovascular structures intact.  The fascia was incised.  The gastroc tendon was identified and freed  from the underlying muscle belly.  The tendon was then resected using curved Shipley scissors.  Correction was noted to the ankle with increased dorsiflexion.  Patient was flushed with copious amounts saline.  Fascia was closed using 4-0 Vicryl and subcutaneous tissue was closing 4-0 Vicryl.  Skin was closing 3-0 nylon.  Patient was placed in hide dry sterile dressing and a well-padded posterior splint.  Proper hyperresponsive noted on deflation of the tourniquet.  Patient tolerated anesthesia procedure well.    Adriel Fitzgerald DPM  [facility]   [phone]    Adriel Fitzgerald DPM     Date: 1/16/2024  Time: 12:30 EST

## 2024-01-16 NOTE — DISCHARGE INSTRUCTIONS
DISCHARGE INSTRUCTIONS  ORTHOPEDICS      For your surgery you had:  General anesthesia (you may have a sore throat for the first 24 hours)  You received an anesthesia medication today that can cause hormonal forms of birth control to be ineffective. You should use a different form of birth control (to prevent pregnancy) for 7 days.   You may experience dizziness, drowsiness, or light-headedness for several hours following surgery  Do not stay alone today or tonight.  Limit your activity for 24 hours.  Resume your diet slowly.  Follow whatever special dietary instructions you may have been given by the doctor.  You should not drive or operate machinery or drink alcohol for 24 hours or while you are taking pain medication.  You should not sign any legally binding documents.  If you had an axillary or regional block, you will not have control of the involved limb for up to 12 hours.  Protect the arm with a sling or follow your physician's specific instructions.  You may remove dressing:  [] in 24 hours  [] in 48 hours  [x] Other: KEEP DRESSING CLEAN DRY & INTACT   You may shower or bathe:  KEEP DRESSING CLEAN DRY & INTACT    Sleep with the injured part elevated on a pillow.  Medications per physician's instructions as indicated on Discharge Medication Information Sheet.  Follow verbal instructions of your doctor.  Sit with the lower leg propped up on a footstool or chair with pillows.  Exercise toes for 10 minutes every hour while awake. Ice bag to injured area for 72 hours.  Apply 20 minutes on - 20 minutes off.  Never place ice directly on skin or cast.    Avoid getting cast or dressing wet.  In addition to these instructions, follow the discharge instructions on postoperative arthroscopic surgery.  SPECIAL INSTRUCTIONS:           Last dose of pain medication was given at: OXYIR-12:48, TYLENOL 11:32   NOTIFY THE PHYSICIAN IF YOU EXPERIENCE:  Numbness of toes.  Inability to move toes.  Extreme coldness, paleness or  blue dis-coloration of  toes.  Excessive swelling of affected surgical site or swelling that causes the cast to rub or cut into skin.  Pain unrelieved by pain medication  Nausea/vomiting not relieved by prescribed medication  Unable to urinate in 6 hours after surgery  Temperature greater than 101 degree Fahrenheit or chills  If unable to reach your doctor, please go to the closest emergency room

## 2024-01-18 ENCOUNTER — TELEPHONE (OUTPATIENT)
Dept: PODIATRY | Facility: CLINIC | Age: 39
End: 2024-01-18
Payer: COMMERCIAL

## 2024-01-18 NOTE — TELEPHONE ENCOUNTER
Provider: DANIS    Caller: MANSOOR    Relationship to Patient: FRIEND    Phone Number: [phone]    Reason for Call: MANSOOR STATES THAT HE THOUGHT THEY WERE TOLD THAT PATIENT NEEDS TO COME IN ON 01/26 TO REMOVE THE CAST AND PUT ON A BOOT. HE IS ASKING IF THEY NEED TO COME IN ON 01/26, OR IF DR. MOSCOSO IS PLANNING ON DOING THIS ON 01/29 AT PATIENT'S POST-OP. PLEASE CALL HIM TO DISCUSS.

## 2024-01-19 NOTE — TELEPHONE ENCOUNTER
Per Dr Fitzgerald, can come in on 1/26/24 instead of the 29th. Message given to the  to call Olu (Friend who is on HIPAA form). To schedule a time for the 26th. Olu was informed someone would be calling

## 2024-01-26 ENCOUNTER — OFFICE VISIT (OUTPATIENT)
Dept: PODIATRY | Facility: CLINIC | Age: 39
End: 2024-01-26
Payer: COMMERCIAL

## 2024-01-26 VITALS
WEIGHT: 293 LBS | HEART RATE: 88 BPM | TEMPERATURE: 97.6 F | OXYGEN SATURATION: 96 % | DIASTOLIC BLOOD PRESSURE: 68 MMHG | SYSTOLIC BLOOD PRESSURE: 106 MMHG | BODY MASS INDEX: 47.14 KG/M2

## 2024-01-26 DIAGNOSIS — M24.573 EQUINUS CONTRACTURE OF ANKLE: Primary | ICD-10-CM

## 2024-01-26 PROCEDURE — 99024 POSTOP FOLLOW-UP VISIT: CPT | Performed by: PODIATRIST

## 2024-01-28 NOTE — PROGRESS NOTES
[facility] - PODIATRY    Today's Date: 01/28/24    Patient Name: Yandel Dean  MRN: [id number]  CSN: [id number]  PCP: Luz Maria Phillips APRN,   Referring Provider: No ref. provider found    SUBJECTIVE     Chief Complaint   Patient presents with    Left Ankle - Post-op     Left Achilles Tendon Lengthening, Left Gastrocnemius Lengthening - Left, done 1/16/24     HPI: Yandel Dean, a 38 y.o.female, presents to clinic.    Patient is a 38-year-old female presenting with left ankle pain.  Patient states she is unable to put her foot on the ground and has been walking on her toes.  Patient states that this happened after a injury in 2021.  She had a juan placed in her leg.  Patient states she did physical therapy and it did not help.  Past Medical History:   Diagnosis Date    Acid reflux     Ankle pain, left     Asthma     USES INHALERS    Bipolar disorder     Depression     Elevated cholesterol     H/O Enlarged tonsils     Hypoglycemia     Lupus     Migraine     PTSD (post-traumatic stress disorder)     no issues waking from anesthesia, would like to be told before she is touch    Recurrent streptococcal tonsillitis      Past Surgical History:   Procedure Laterality Date    ACHILLES TENDON SURGERY Left 1/16/2024    Procedure: LEFT ACHILLES TENDON LENGTHENING, LEFT GASTROCNEMIUS LENGTHENING;  Surgeon: Adriel Fitzgerald DPM;  Location: [facility] MAIN OR;  Service: Podiatry;  Laterality: Left;    ANKLE SURGERY Left 06/2021    HYSTERECTOMY      LEG SURGERY Left     plate, juan, bolts and screws from knee to ankle    TONSILLECTOMY AND ADENOIDECTOMY Bilateral 05/26/2023    Procedure: TONSILLECTOMY AND NASOPHARYNGOSCOPY;  Surgeon: Wilner Montero MD;  Location: [facility] MAIN OR;  Service: ENT;  Laterality: Bilateral;     Family History   Problem Relation Age of Onset    Breast cancer Maternal Grandmother 35    Ovarian cancer Maternal Grandmother 38    Uterine cancer Neg Hx     Cervical cancer Neg Hx     Colon cancer  Neg Hx     Stomach cancer Neg Hx     Skin cancer Neg Hx      Social History     Socioeconomic History    Marital status: Single    Number of children: 3   Tobacco Use    Smoking status: Former     Packs/day: 1     Types: Cigarettes     Quit date:      Years since quittin.0     Passive exposure: Never    Smokeless tobacco: Never   Vaping Use    Vaping Use: Never used   Substance and Sexual Activity    Alcohol use: Yes     Comment: rare    Drug use: Not Currently     Types: Methamphetamines, Heroin     Comment: off 3 yrs    Sexual activity: Defer     Partners: Male     Birth control/protection: Hysterectomy     Allergies   Allergen Reactions    Penicillins Anaphylaxis    Sulfa Antibiotics Anaphylaxis     Current Outpatient Medications   Medication Sig Dispense Refill    albuterol sulfate  (90 Base) MCG/ACT inhaler Every 6 (Six) Hours.      ALPRAZolam (XANAX) 0.5 MG tablet Take 1 tablet by mouth 3 (Three) Times a Day As Needed. for anxiety      atorvastatin (LIPITOR) 10 MG tablet Take 1 tablet by mouth Every Night.      Desvenlafaxine Succinate ER 25 MG tablet sustained-release 24 hour Take 1 tablet by mouth Daily.      HYDROcodone-acetaminophen (Norco) 7.5-325 MG per tablet Take 1 tablet by mouth Every 6 (Six) Hours As Needed for Moderate Pain. 30 tablet 0    levocetirizine (XYZAL) 5 MG tablet Take 1 tablet by mouth Every Evening.      OLANZapine zydis (ZyPREXA) 10 MG disintegrating tablet DISSOLVE 1 TABLET ON THE TONGUE AT BEDTIME AS NEEDED FOR ANXIETY OR AGITATION      omeprazole (priLOSEC) 20 MG capsule Take 1 capsule by mouth Every Night.      promethazine (PHENERGAN) 25 MG tablet Take 1 tablet by mouth Every 6 (Six) Hours As Needed for Nausea or Vomiting. 20 tablet 0    ziprasidone (GEODON) 80 MG capsule Take 1 capsule by mouth 2 (Two) Times a Day With Meals.       No current facility-administered medications for this visit.     Review of Systems   Constitutional: Negative.    HENT: Negative.      Eyes: Negative.    Respiratory: Negative.     Cardiovascular: Negative.    Gastrointestinal: Negative.    Endocrine: Negative.    Genitourinary: Negative.    Musculoskeletal: Negative.         Left ankle pain   Skin: Negative.    Allergic/Immunologic: Negative.    Neurological: Negative.    Hematological: Negative.    Psychiatric/Behavioral: Negative.     All other systems reviewed and are negative.      OBJECTIVE     Vitals:    01/26/24 1357   BP: 106/68   Pulse: 88   Temp: 97.6 °F (36.4 °C)   SpO2: 96%       WBC   Date Value Ref Range Status   05/24/2023 8.41 3.40 - 10.80 10*3/mm3 Final     RBC   Date Value Ref Range Status   05/24/2023 4.56 3.77 - 5.28 10*6/mm3 Final     Hemoglobin   Date Value Ref Range Status   05/27/2023 12.5 12.0 - 15.9 g/dL Final     Hematocrit   Date Value Ref Range Status   05/27/2023 35.0 34.0 - 46.6 % Final     MCV   Date Value Ref Range Status   05/24/2023 87.7 79.0 - 97.0 fL Final     MCH   Date Value Ref Range Status   05/24/2023 31.1 26.6 - 33.0 pg Final     MCHC   Date Value Ref Range Status   05/24/2023 35.5 31.5 - 35.7 g/dL Final     RDW   Date Value Ref Range Status   05/24/2023 11.5 (L) 12.3 - 15.4 % Final     RDW-SD   Date Value Ref Range Status   05/24/2023 37.4 37.0 - 54.0 fl Final     MPV   Date Value Ref Range Status   05/24/2023 11.5 6.0 - 12.0 fL Final     Platelets   Date Value Ref Range Status   05/24/2023 312 140 - 450 10*3/mm3 Final     Neutrophil %   Date Value Ref Range Status   05/24/2023 60.5 42.7 - 76.0 % Final     Lymphocyte %   Date Value Ref Range Status   05/24/2023 30.6 19.6 - 45.3 % Final     Monocyte %   Date Value Ref Range Status   05/24/2023 6.4 5.0 - 12.0 % Final     Eosinophil %   Date Value Ref Range Status   05/24/2023 1.2 0.3 - 6.2 % Final     Basophil %   Date Value Ref Range Status   05/24/2023 0.8 0.0 - 1.5 % Final     Immature Grans %   Date Value Ref Range Status   05/24/2023 0.5 0.0 - 0.5 % Final     Neutrophils Absolute   Date Value Ref  "Range Status   09/29/2021 10.2 (H) 1.5 - 7.1 x10(3)/ul Final     Neutrophils, Absolute   Date Value Ref Range Status   05/24/2023 5.09 1.70 - 7.00 10*3/mm3 Final     Lymphocytes, Absolute   Date Value Ref Range Status   05/24/2023 2.57 0.70 - 3.10 10*3/mm3 Final     Monocytes, Absolute   Date Value Ref Range Status   05/24/2023 0.54 0.10 - 0.90 10*3/mm3 Final     Eosinophils Absolute   Date Value Ref Range Status   09/29/2021 0.1 0.0 - 0.7 x10(3)/ul Final     Eosinophils, Absolute   Date Value Ref Range Status   05/24/2023 0.10 0.00 - 0.40 10*3/mm3 Final     Basophils Absolute   Date Value Ref Range Status   09/29/2021 0.1 0.0 - 0.3 x10(3)/ul Final     Basophils, Absolute   Date Value Ref Range Status   05/24/2023 0.07 0.00 - 0.20 10*3/mm3 Final     Immature Grans, Absolute   Date Value Ref Range Status   05/24/2023 0.04 0.00 - 0.05 10*3/mm3 Final     nRBC   Date Value Ref Range Status   05/24/2023 0.0 0.0 - 0.2 /100 WBC Final         No results found for: \"GLUCOSE\", \"BUN\", \"CREATININE\", \"EGFRIFNONA\", \"EGFRIFAFRI\", \"BCR\", \"K\", \"CO2\", \"CALCIUM\", \"PROTENTOTREF\", \"ALBUMIN\", \"LABIL2\", \"BILIRUBIN\", \"AST\", \"ALT\"    Patient seen in no apparent distress.      PHYSICAL EXAM:     Incision co-apted. No pain to calf. No acute signs of infection. Increased ROM to ankle joint.     RADIOLOGY:        No results found.    ASSESSMENT/PLAN     Diagnoses and all orders for this visit:    1. Equinus contracture of ankle (Primary)      Patient to begin stretching and strengthening to the ankle. RTC in 1 month, Exercises dispensed.     Comprehensive lower extremity examination and evaluation was performed.    Discussed findings and treatment plan including risks, benefits, and treatment options with patient in detail. Patient agreed with treatment plan.    Medications and allergies reviewed.  Reviewed available lab values along with other pertinent labs.  These were discussed with the patient.    An After Visit Summary was printed and " given to the patient at discharge, including (if requested) any available informative/educational handouts regarding diagnosis, treatment, or medications. All questions were answered to patient/family satisfaction. Should symptoms fail to improve or worsen they agree to call or return to clinic or to go to the Emergency Department. Discussed the importance of following up with any needed screening tests/labs/specialist appointments and any requested follow-up recommended by me today. Importance of maintaining follow-up discussed and patient accepts that missed appointments can delay diagnosis and potentially lead to worsening of conditions.    Return in about 1 month (around 2/26/2024)., or sooner if acute issues arise.    This document has been electronically signed by Adriel Fitzgerald DPM on January 28, 2024 18:04 EST

## 2024-02-29 ENCOUNTER — OFFICE VISIT (OUTPATIENT)
Dept: PODIATRY | Facility: CLINIC | Age: 39
End: 2024-02-29
Payer: COMMERCIAL

## 2024-02-29 VITALS
SYSTOLIC BLOOD PRESSURE: 158 MMHG | OXYGEN SATURATION: 97 % | WEIGHT: 293 LBS | BODY MASS INDEX: 47.59 KG/M2 | DIASTOLIC BLOOD PRESSURE: 96 MMHG | HEART RATE: 81 BPM | TEMPERATURE: 98.4 F

## 2024-02-29 DIAGNOSIS — M24.573 EQUINUS CONTRACTURE OF ANKLE: Primary | ICD-10-CM

## 2024-02-29 NOTE — PROGRESS NOTES
[facility] - PODIATRY    Today's Date: 02/29/24    Patient Name: Yandel Dean  MRN: [id number]  CSN: [id number]  PCP: Luz Maria Phillips APRN,   Referring Provider: No ref. provider found    SUBJECTIVE     Chief Complaint   Patient presents with    Left Ankle - Post-op     Equinus contracture of ankle     HPI: Yandel Dean, a 38 y.o.female, presents to clinic.    Patient is a 38-year-old female presenting with left ankle pain.  Patient states she is unable to put her foot on the ground and has been walking on her toes.  Patient states that this happened after a injury in 2021.  She had a juan placed in her leg.  Patient states she did physical therapy and it did not help.    2/29/2024-patient states she is without pain and can finally walk with her foot flat on the ground.  She is very happy with her treatment.  Past Medical History:   Diagnosis Date    Acid reflux     Ankle pain, left     Asthma     USES INHALERS    Bipolar disorder     Depression     Elevated cholesterol     H/O Enlarged tonsils     Hypoglycemia     Lupus     Migraine     PTSD (post-traumatic stress disorder)     no issues waking from anesthesia, would like to be told before she is touch    Recurrent streptococcal tonsillitis      Past Surgical History:   Procedure Laterality Date    ACHILLES TENDON SURGERY Left 1/16/2024    Procedure: LEFT ACHILLES TENDON LENGTHENING, LEFT GASTROCNEMIUS LENGTHENING;  Surgeon: Adriel Fitzgerald DPM;  Location: [facility] MAIN OR;  Service: Podiatry;  Laterality: Left;    ANKLE SURGERY Left 06/2021    HYSTERECTOMY      LEG SURGERY Left     plate, juan, bolts and screws from knee to ankle    TONSILLECTOMY AND ADENOIDECTOMY Bilateral 05/26/2023    Procedure: TONSILLECTOMY AND NASOPHARYNGOSCOPY;  Surgeon: Wilner Montero MD;  Location: [facility] MAIN OR;  Service: ENT;  Laterality: Bilateral;     Family History   Problem Relation Age of Onset    Breast cancer Maternal Grandmother 35    Ovarian cancer Maternal  Grandmother 38    Uterine cancer Neg Hx     Cervical cancer Neg Hx     Colon cancer Neg Hx     Stomach cancer Neg Hx     Skin cancer Neg Hx      Social History     Socioeconomic History    Marital status: Single    Number of children: 3   Tobacco Use    Smoking status: Former     Packs/day: 1     Types: Cigarettes     Quit date:      Years since quittin.1     Passive exposure: Never    Smokeless tobacco: Never   Vaping Use    Vaping Use: Never used   Substance and Sexual Activity    Alcohol use: Yes     Comment: rare    Drug use: Not Currently     Types: Methamphetamines, Heroin     Comment: off 3 yrs    Sexual activity: Defer     Partners: Male     Birth control/protection: Hysterectomy     Allergies   Allergen Reactions    Penicillins Anaphylaxis    Sulfa Antibiotics Anaphylaxis     Current Outpatient Medications   Medication Sig Dispense Refill    albuterol sulfate  (90 Base) MCG/ACT inhaler Every 6 (Six) Hours.      ALPRAZolam (XANAX) 0.5 MG tablet Take 1 tablet by mouth 3 (Three) Times a Day As Needed. for anxiety      atorvastatin (LIPITOR) 10 MG tablet Take 1 tablet by mouth Every Night.      Desvenlafaxine Succinate ER 25 MG tablet sustained-release 24 hour Take 1 tablet by mouth Daily.      levocetirizine (XYZAL) 5 MG tablet Take 1 tablet by mouth Every Evening.      OLANZapine zydis (ZyPREXA) 10 MG disintegrating tablet DISSOLVE 1 TABLET ON THE TONGUE AT BEDTIME AS NEEDED FOR ANXIETY OR AGITATION      omeprazole (priLOSEC) 20 MG capsule Take 1 capsule by mouth Every Night.      promethazine (PHENERGAN) 25 MG tablet Take 1 tablet by mouth Every 6 (Six) Hours As Needed for Nausea or Vomiting. 20 tablet 0    ziprasidone (GEODON) 80 MG capsule Take 1 capsule by mouth 2 (Two) Times a Day With Meals.      HYDROcodone-acetaminophen (Norco) 7.5-325 MG per tablet Take 1 tablet by mouth Every 6 (Six) Hours As Needed for Moderate Pain. (Patient not taking: Reported on 2024) 30 tablet 0     No  current facility-administered medications for this visit.     Review of Systems   Constitutional: Negative.    HENT: Negative.     Eyes: Negative.    Respiratory: Negative.     Cardiovascular: Negative.    Gastrointestinal: Negative.    Endocrine: Negative.    Genitourinary: Negative.    Musculoskeletal: Negative.         Left ankle pain   Skin: Negative.    Allergic/Immunologic: Negative.    Neurological: Negative.    Hematological: Negative.    Psychiatric/Behavioral: Negative.     All other systems reviewed and are negative.      OBJECTIVE     Vitals:    02/29/24 1052   BP: 158/96   Pulse: 81   Temp: 98.4 °F (36.9 °C)   SpO2: 97%       WBC   Date Value Ref Range Status   05/24/2023 8.41 3.40 - 10.80 10*3/mm3 Final     RBC   Date Value Ref Range Status   05/24/2023 4.56 3.77 - 5.28 10*6/mm3 Final     Hemoglobin   Date Value Ref Range Status   05/27/2023 12.5 12.0 - 15.9 g/dL Final     Hematocrit   Date Value Ref Range Status   05/27/2023 35.0 34.0 - 46.6 % Final     MCV   Date Value Ref Range Status   05/24/2023 87.7 79.0 - 97.0 fL Final     MCH   Date Value Ref Range Status   05/24/2023 31.1 26.6 - 33.0 pg Final     MCHC   Date Value Ref Range Status   05/24/2023 35.5 31.5 - 35.7 g/dL Final     RDW   Date Value Ref Range Status   05/24/2023 11.5 (L) 12.3 - 15.4 % Final     RDW-SD   Date Value Ref Range Status   05/24/2023 37.4 37.0 - 54.0 fl Final     MPV   Date Value Ref Range Status   05/24/2023 11.5 6.0 - 12.0 fL Final     Platelets   Date Value Ref Range Status   05/24/2023 312 140 - 450 10*3/mm3 Final     Neutrophil %   Date Value Ref Range Status   05/24/2023 60.5 42.7 - 76.0 % Final     Lymphocyte %   Date Value Ref Range Status   05/24/2023 30.6 19.6 - 45.3 % Final     Monocyte %   Date Value Ref Range Status   05/24/2023 6.4 5.0 - 12.0 % Final     Eosinophil %   Date Value Ref Range Status   05/24/2023 1.2 0.3 - 6.2 % Final     Basophil %   Date Value Ref Range Status   05/24/2023 0.8 0.0 - 1.5 % Final  "    Immature Grans %   Date Value Ref Range Status   05/24/2023 0.5 0.0 - 0.5 % Final     Neutrophils Absolute   Date Value Ref Range Status   09/29/2021 10.2 (H) 1.5 - 7.1 x10(3)/ul Final     Neutrophils, Absolute   Date Value Ref Range Status   05/24/2023 5.09 1.70 - 7.00 10*3/mm3 Final     Lymphocytes, Absolute   Date Value Ref Range Status   05/24/2023 2.57 0.70 - 3.10 10*3/mm3 Final     Monocytes, Absolute   Date Value Ref Range Status   05/24/2023 0.54 0.10 - 0.90 10*3/mm3 Final     Eosinophils Absolute   Date Value Ref Range Status   09/29/2021 0.1 0.0 - 0.7 x10(3)/ul Final     Eosinophils, Absolute   Date Value Ref Range Status   05/24/2023 0.10 0.00 - 0.40 10*3/mm3 Final     Basophils Absolute   Date Value Ref Range Status   09/29/2021 0.1 0.0 - 0.3 x10(3)/ul Final     Basophils, Absolute   Date Value Ref Range Status   05/24/2023 0.07 0.00 - 0.20 10*3/mm3 Final     Immature Grans, Absolute   Date Value Ref Range Status   05/24/2023 0.04 0.00 - 0.05 10*3/mm3 Final     nRBC   Date Value Ref Range Status   05/24/2023 0.0 0.0 - 0.2 /100 WBC Final         No results found for: \"GLUCOSE\", \"BUN\", \"CREATININE\", \"EGFRIFNONA\", \"EGFRIFAFRI\", \"BCR\", \"K\", \"CO2\", \"CALCIUM\", \"PROTENTOTREF\", \"ALBUMIN\", \"LABIL2\", \"BILIRUBIN\", \"AST\", \"ALT\"    Patient seen in no apparent distress.      PHYSICAL EXAM:     Incision co-apted. No pain to calf. No acute signs of infection. Increased ROM to ankle joint.     RADIOLOGY:        No results found.    ASSESSMENT/PLAN     Diagnoses and all orders for this visit:    1. Equinus contracture of ankle (Primary)      Patient doing well without pain to the foot and has been walking with her foot flat on the ground.  Return to clinic as needed.    Comprehensive lower extremity examination and evaluation was performed.    Discussed findings and treatment plan including risks, benefits, and treatment options with patient in detail. Patient agreed with treatment plan.    Medications and allergies " reviewed.  Reviewed available lab values along with other pertinent labs.  These were discussed with the patient.    An After Visit Summary was printed and given to the patient at discharge, including (if requested) any available informative/educational handouts regarding diagnosis, treatment, or medications. All questions were answered to patient/family satisfaction. Should symptoms fail to improve or worsen they agree to call or return to clinic or to go to the Emergency Department. Discussed the importance of following up with any needed screening tests/labs/specialist appointments and any requested follow-up recommended by me today. Importance of maintaining follow-up discussed and patient accepts that missed appointments can delay diagnosis and potentially lead to worsening of conditions.    Return if symptoms worsen or fail to improve., or sooner if acute issues arise.    This document has been electronically signed by Adriel Fitzgerald DPM on February 29, 2024 13:34 EST

## 2024-05-07 NOTE — PROGRESS NOTES
Chief Complaint   Patient presents with    Annual Exam       HPI:   38 y.o. . Presents for well woman exam. s/p HYST, still has one or both ovaries, maglignant dx dysplasia per patient report, records not available, requested  Menses:   none  Pain:  None  Last pap: 2018 per patient prior to hysterectomy   Complaints: Pt has no complaints today.    Patient reports that she is not currently experiencing any symptoms of urinary incontinence.       Past Medical History:   Diagnosis Date    Abnormal Pap smear of cervix     Acid reflux     Ankle pain, left     Asthma     USES INHALERS    Bipolar disorder     Depression     Elevated cholesterol     H/O Enlarged tonsils     HPV (human papilloma virus) infection     Hypertension     Hypoglycemia     Lupus     Migraine     Ovarian cyst     PTSD (post-traumatic stress disorder)     no issues waking from anesthesia, would like to be told before she is touch    Recurrent streptococcal tonsillitis       Past Surgical History:   Procedure Laterality Date    ACHILLES TENDON SURGERY Left 2024    Procedure: LEFT ACHILLES TENDON LENGTHENING, LEFT GASTROCNEMIUS LENGTHENING;  Surgeon: Adriel Fitzgerald DPM;  Location: [facility];  Service: Podiatry;  Laterality: Left;    ANKLE SURGERY Left 2021    HYSTERECTOMY  2018    abnormal pap smears, [place]    LEG SURGERY Left     plate, juan, bolts and screws from knee to ankle    TONSILLECTOMY AND ADENOIDECTOMY Bilateral 2023    Procedure: TONSILLECTOMY AND NASOPHARYNGOSCOPY;  Surgeon: Wilner Montero MD;  Location: [facility] MAIN OR;  Service: ENT;  Laterality: Bilateral;      Family History   Problem Relation Age of Onset    Breast cancer Maternal Grandmother 34    Ovarian cancer Maternal Great-Grandmother 38    Uterine cancer Neg Hx     Cervical cancer Neg Hx     Colon cancer Neg Hx     Stomach cancer Neg Hx     Skin cancer Neg Hx      Allergies as of 2024 - Reviewed 2024   Allergen Reaction  "Noted    Penicillins Anaphylaxis 06/02/2022    Sulfa antibiotics Anaphylaxis 06/02/2022        PCP: does manage PMHx and preventative labs    /83   Pulse 94   Ht 172.7 cm (68\")   Wt 136 kg (299 lb)   BMI 45.46 kg/m²     PHYSICAL EXAM: Chaperone present   General- NAD, alert and oriented, appropriate  Psych- Normal mood, good memory  Neck- No masses, no thyroid enlargement  Lymphatic- No palpable neck, axillary, or groin nodes  CV- Regular rhythm, no murmurs  Resp- CTA to bases, no wheezes  Abdomen- Soft, non distended, non tender, no masses  Breast left-  Bilaterally symmetrical, no masses, non tender, no nipple discharge  Breast right- Bilaterally symmetrical, no masses, non tender, no nipple discharge  External genitalia- Normal female, no lesions  Urethra/meatus- Normal, no masses, non tender, no prolapse  Bladder- Normal, no masses, non tender, no prolapse  Vagina- Normal, no atrophy, no lesions, no discharge, no prolapse  Cvx- Absent  Uterus: Absent  Adnexa- No mass, non tender  Anus/Rectum/Perineum- Not performed  Ext- No edema, no cyanosis    Skin- No lesions, no rashes, no acanthosis nigricans       ASSESSMENT and PLAN:    Diagnoses and all orders for this visit:    1. Well woman exam (Primary)  -     IgP, Aptima HPV    Preventative:   BREAST HEALTH- Monthly self breast exam importance and how to reviewed. MMG and/or MRI (prn) reviewed per society guidelines and her individual history. Screening Imaging: Not medically needed  CERVICAL CANCER Screening- Reviewed current ASCCP guidelines for screening w and wo cotest HPV, age specific.  Screen: Updated today  COLON CANCER Screening- Reviewed current medical society guidelines and options.  Screen:  Not medically needed  SEXUAL HEALTH: Declines STD screening  BONE HEALTH- Reviewed current medical society guidelines and options for testing, calcium and vit D intake.  Weight bearing exercise.  DEXA: Not medically needed  VACCINATIONS Recommended: Up to " date  Importance discussed, risk being unvaccinated reviewed.  Questions answered  Genetic testing: Counseled and evaluated for genetic testing.  Testing accepted.  Smoking status- NON SMOKER  Follow up PCP/Specialist PMHx and Labs    Follow Up:  Return for pt to bring records of surgical hx- hysterectomy.        Esther Lyles, APRN  05/21/2024

## 2024-05-21 ENCOUNTER — OFFICE VISIT (OUTPATIENT)
Dept: OBSTETRICS AND GYNECOLOGY | Facility: CLINIC | Age: 39
End: 2024-05-21
Payer: COMMERCIAL

## 2024-05-21 VITALS
BODY MASS INDEX: 44.41 KG/M2 | HEIGHT: 68 IN | HEART RATE: 94 BPM | WEIGHT: 293 LBS | DIASTOLIC BLOOD PRESSURE: 83 MMHG | SYSTOLIC BLOOD PRESSURE: 124 MMHG

## 2024-05-21 DIAGNOSIS — Z01.419 WELL WOMAN EXAM: Primary | ICD-10-CM

## 2024-05-21 PROCEDURE — 87624 HPV HI-RISK TYP POOLED RSLT: CPT | Performed by: NURSE PRACTITIONER

## 2024-05-21 PROCEDURE — G0123 SCREEN CERV/VAG THIN LAYER: HCPCS | Performed by: NURSE PRACTITIONER

## 2024-05-21 RX ORDER — DESVENLAFAXINE 100 MG/1
100 TABLET, EXTENDED RELEASE ORAL DAILY
COMMUNITY

## 2024-05-21 RX ORDER — METRONIDAZOLE 7.5 MG/G
GEL TOPICAL
COMMUNITY
Start: 2024-02-26

## 2024-05-21 RX ORDER — OLANZAPINE 15 MG/1
15 TABLET ORAL
COMMUNITY

## 2024-05-21 RX ORDER — LAMOTRIGINE 25 MG/1
TABLET ORAL
COMMUNITY
Start: 2024-04-01

## 2024-05-21 RX ORDER — MONTELUKAST SODIUM 10 MG/1
10 TABLET ORAL EVERY EVENING
COMMUNITY

## 2024-05-21 RX ORDER — DILTIAZEM HYDROCHLORIDE 60 MG/1
TABLET, FILM COATED ORAL
COMMUNITY
Start: 2024-03-07

## 2024-05-21 RX ORDER — ONDANSETRON 4 MG/1
TABLET, ORALLY DISINTEGRATING ORAL
COMMUNITY

## 2024-05-21 RX ORDER — CETIRIZINE HYDROCHLORIDE 10 MG/1
10 TABLET ORAL DAILY
COMMUNITY

## 2024-05-21 RX ORDER — AMITRIPTYLINE HYDROCHLORIDE 10 MG/1
1 TABLET, FILM COATED ORAL
COMMUNITY

## 2024-05-21 RX ORDER — OMEPRAZOLE 40 MG/1
40 CAPSULE, DELAYED RELEASE ORAL DAILY
COMMUNITY

## 2024-05-21 RX ORDER — PREGABALIN 100 MG/1
CAPSULE ORAL
COMMUNITY

## 2024-05-27 LAB
CYTOLOGIST CVX/VAG CYTO: NORMAL
CYTOLOGY CVX/VAG DOC CYTO: NORMAL
CYTOLOGY CVX/VAG DOC THIN PREP: NORMAL
DX ICD CODE: NORMAL
HPV I/H RISK 4 DNA CVX QL PROBE+SIG AMP: NEGATIVE
Lab: NORMAL
OTHER STN SPEC: NORMAL
STAT OF ADQ CVX/VAG CYTO-IMP: NORMAL

## 2024-08-20 ENCOUNTER — OFFICE VISIT (OUTPATIENT)
Dept: PULMONOLOGY | Facility: CLINIC | Age: 39
End: 2024-08-20
Payer: COMMERCIAL

## 2024-08-20 VITALS
HEART RATE: 82 BPM | RESPIRATION RATE: 18 BRPM | TEMPERATURE: 98.2 F | SYSTOLIC BLOOD PRESSURE: 125 MMHG | HEIGHT: 68 IN | DIASTOLIC BLOOD PRESSURE: 77 MMHG | BODY MASS INDEX: 44.41 KG/M2 | OXYGEN SATURATION: 98 % | WEIGHT: 293 LBS

## 2024-08-20 DIAGNOSIS — J45.40 MODERATE PERSISTENT ASTHMA, UNSPECIFIED WHETHER COMPLICATED: Primary | ICD-10-CM

## 2024-08-20 DIAGNOSIS — G47.33 OSA (OBSTRUCTIVE SLEEP APNEA): ICD-10-CM

## 2024-08-20 DIAGNOSIS — E66.01 MORBID (SEVERE) OBESITY DUE TO EXCESS CALORIES: ICD-10-CM

## 2024-08-20 PROCEDURE — 1160F RVW MEDS BY RX/DR IN RCRD: CPT | Performed by: INTERNAL MEDICINE

## 2024-08-20 PROCEDURE — 99203 OFFICE O/P NEW LOW 30 MIN: CPT | Performed by: INTERNAL MEDICINE

## 2024-08-20 PROCEDURE — 1159F MED LIST DOCD IN RCRD: CPT | Performed by: INTERNAL MEDICINE

## 2024-08-20 RX ORDER — DESVENLAFAXINE SUCCINATE 50 MG/1
1 TABLET, EXTENDED RELEASE ORAL DAILY
COMMUNITY
Start: 2024-07-16

## 2024-08-20 RX ORDER — DEXTROMETHORPHAN HYDROBROMIDE, GUAIFENESIN 20; 400 MG/20ML; MG/20ML
10 SOLUTION ORAL
COMMUNITY
Start: 2024-05-21

## 2024-08-20 RX ORDER — LISINOPRIL 10 MG/1
1 TABLET ORAL DAILY
COMMUNITY
Start: 2024-07-12

## 2024-08-20 RX ORDER — BENZONATATE 100 MG/1
100 CAPSULE ORAL 3 TIMES DAILY PRN
COMMUNITY
Start: 2024-08-02

## 2024-08-20 NOTE — PROGRESS NOTES
Pulmonary Consultation    Amy Arvizu AP*,    Thank you for asking me to see Yandel Dean for   Chief Complaint   Patient presents with    Establish Care    Asthma    Shortness of Breath    Cough    Wheezing   .      History of Present Illness  Yandel Dean is a 38 y.o. female with a PMH significant for bronchial asthma tobacco abuse presents for evaluation patient complains of cough mostly nocturnal along with wheezing and shortness of breath off-and-on worse on activity and at times at night patient also complains of excessive snoring and fatigue with daytime sleepiness  Patient has a history of bipolar disorder and is on medications  She takes Tessalon Perles for her cough  Patient takes albuterol inhaler as needed along with Singulair      Tobacco use history:  Former smoker      Review of Systems: History obtained from chart review and the patient.  Review of Systems   Respiratory:  Positive for cough, shortness of breath and wheezing.    All other systems reviewed and are negative.    As described in the HPI. Otherwise, remainder of ROS (14 systems) were negative.    Patient Active Problem List   Diagnosis    Asthma    Hypertriglyceridemia    Systemic lupus erythematosus    Female dyspareunia    Acute vaginitis    DEVNI (obstructive sleep apnea)    Equinus contracture of ankle         Current Outpatient Medications:     Albuterol Sulfate, sensor, 108 (90 Base) MCG/ACT aerosol powder , Inhale 2 puffs every 4 hours by inhalation route., Disp: , Rfl:     ALPRAZolam (XANAX) 0.5 MG tablet, Take 1 tablet by mouth 3 (Three) Times a Day As Needed. for anxiety, Disp: , Rfl:     amitriptyline (ELAVIL) 10 MG tablet, Take 1 tablet by mouth every night at bedtime., Disp: , Rfl:     atorvastatin (LIPITOR) 10 MG tablet, Take 1 tablet by mouth Every Night., Disp: , Rfl:     benzonatate (TESSALON) 100 MG capsule, Take 1 capsule by mouth 3 (Three) Times a Day As Needed for Cough., Disp: , Rfl:     cetirizine (zyrTEC)  10 MG tablet, Take 1 tablet by mouth Daily., Disp: , Rfl:     desvenlafaxine (PRISTIQ) 100 MG 24 hr tablet, Take 1 tablet by mouth Daily., Disp: , Rfl:     desvenlafaxine (PRISTIQ) 50 MG 24 hr tablet, Take 1 tablet by mouth Daily., Disp: , Rfl:     lamoTRIgine (LaMICtal) 25 MG tablet, TAKE 1 TABLET BY MOUTH AT BEDTIME FOR 2 WEEKS. INCREASE TO 2 TABLETS AT BEDTIME, Disp: , Rfl:     levocetirizine (XYZAL) 5 MG tablet, Take 1 tablet by mouth Every Evening., Disp: , Rfl:     lisinopril (PRINIVIL,ZESTRIL) 10 MG tablet, Take 1 tablet by mouth Daily., Disp: , Rfl:     metroNIDAZOLE (METROGEL) 0.75 % gel, APPLY 1 GRAM TOPICALLY TO FACE TWICE DAILY, Disp: , Rfl:     montelukast (SINGULAIR) 10 MG tablet, Take 1 tablet by mouth Every Evening., Disp: , Rfl:     OLANZapine (zyPREXA) 15 MG tablet, Take 1 tablet by mouth every night at bedtime., Disp: , Rfl:     omeprazole (priLOSEC) 40 MG capsule, Take 1 capsule by mouth Daily., Disp: , Rfl:     ondansetron ODT (ZOFRAN-ODT) 4 MG disintegrating tablet, DISSOLVE 1 TABLET ON THE TONGUE THREE TIMES DAILY FOR 7 DAYS, Disp: , Rfl:     pregabalin (LYRICA) 100 MG capsule, TAKE 1 CAPSULE BY MOUTH TWICE DAILY AS DIRECTED, Disp: , Rfl:     Robitussin Cough+Chest Neftaly DM  MG/20ML liquid, Take 10 mL by mouth., Disp: , Rfl:     Symbicort 80-4.5 MCG/ACT inhaler, INHALE 2 PUFFS BY MOUTH TWICE DAILY AS DIRECTED, Disp: , Rfl:     ziprasidone (GEODON) 80 MG capsule, Take 1 capsule by mouth 2 (Two) Times a Day With Meals., Disp: , Rfl:     albuterol sulfate  (90 Base) MCG/ACT inhaler, Every 6 (Six) Hours. (Patient not taking: Reported on 8/20/2024), Disp: , Rfl:     Methotrexate (XATMEP PO), 2.5 mg daily (Patient not taking: Reported on 8/20/2024), Disp: , Rfl:     promethazine (PHENERGAN) 25 MG tablet, Take 1 tablet by mouth Every 6 (Six) Hours As Needed for Nausea or Vomiting. (Patient not taking: Reported on 8/20/2024), Disp: 20 tablet, Rfl: 0    Allergies   Allergen Reactions     Penicillins Anaphylaxis    Sulfa Antibiotics Anaphylaxis       Past Medical History:   Diagnosis Date    Abnormal Pap smear of cervix     Acid reflux     Ankle pain, left     Asthma     USES INHALERS    Bipolar disorder     Depression     Elevated cholesterol     H/O Enlarged tonsils     HPV (human papilloma virus) infection     Hypertension     Hypoglycemia     Lupus     Migraine     Ovarian cyst     PTSD (post-traumatic stress disorder)     no issues waking from anesthesia, would like to be told before she is touch    Recurrent streptococcal tonsillitis      Past Surgical History:   Procedure Laterality Date    ACHILLES TENDON SURGERY Left 2024    Procedure: LEFT ACHILLES TENDON LENGTHENING, LEFT GASTROCNEMIUS LENGTHENING;  Surgeon: Adriel Fitzgerald DPM;  Location: [facility] MAIN OR;  Service: Podiatry;  Laterality: Left;    ANKLE SURGERY Left 2021    HYSTERECTOMY  2018    abnormal pap smears, dysplasia[place]    LEG SURGERY Left     plate, juan, bolts and screws from knee to ankle    TONSILLECTOMY AND ADENOIDECTOMY Bilateral 2023    Procedure: TONSILLECTOMY AND NASOPHARYNGOSCOPY;  Surgeon: Wilner Montero MD;  Location: [facility] MAIN OR;  Service: ENT;  Laterality: Bilateral;     Social History     Socioeconomic History    Marital status: Single    Number of children: 3   Tobacco Use    Smoking status: Former     Current packs/day: 0.00     Types: Cigarettes     Quit date:      Years since quittin.6     Passive exposure: Never    Smokeless tobacco: Never   Vaping Use    Vaping status: Never Used   Substance and Sexual Activity    Alcohol use: Yes     Comment: rare    Drug use: Not Currently     Types: Methamphetamines, Heroin     Comment: off 3 yrs    Sexual activity: Yes     Partners: Male     Birth control/protection: Hysterectomy     Family History   Problem Relation Age of Onset    Breast cancer Maternal Grandmother 34    Ovarian cancer Maternal Great-Grandmother 38    Uterine  "cancer Neg Hx     Cervical cancer Neg Hx     Colon cancer Neg Hx     Stomach cancer Neg Hx     Skin cancer Neg Hx        No radiology results for the last 90 days.        Objective     Blood pressure 125/77, pulse 82, temperature 98.2 °F (36.8 °C), temperature source Tympanic, resp. rate 18, height 172.7 cm (68\"), weight (!) 142 kg (313 lb 6.4 oz), SpO2 98%, not currently breastfeeding.  Physical Exam  Vitals and nursing note reviewed.   Constitutional:       Appearance: She is obese.   HENT:      Head: Normocephalic and atraumatic.      Nose: Nose normal.      Mouth/Throat:      Mouth: Mucous membranes are moist.      Pharynx: Oropharynx is clear.   Eyes:      Extraocular Movements: Extraocular movements intact.      Conjunctiva/sclera: Conjunctivae normal.      Pupils: Pupils are equal, round, and reactive to light.   Cardiovascular:      Rate and Rhythm: Normal rate and regular rhythm.      Pulses: Normal pulses.      Heart sounds: Normal heart sounds.   Pulmonary:      Effort: Pulmonary effort is normal.      Breath sounds: Rhonchi present.   Abdominal:      General: Abdomen is flat. Bowel sounds are normal.      Palpations: Abdomen is soft.   Musculoskeletal:         General: Normal range of motion.      Cervical back: Normal range of motion and neck supple.   Skin:     General: Skin is warm.      Capillary Refill: Capillary refill takes 2 to 3 seconds.   Neurological:      General: No focal deficit present.      Mental Status: She is alert and oriented to person, place, and time.   Psychiatric:         Mood and Affect: Mood normal.         Behavior: Behavior normal.       Immunization History   Administered Date(s) Administered    Covid-19 (Pfizer) Gray Cap Monovalent 03/01/2022, 04/05/2022    Influenza, Unspecified 03/27/2023            Assessment & Plan     Diagnoses and all orders for this visit:    1. Moderate persistent asthma, unspecified whether complicated (Primary)  -     Home Sleep Study; Future  -   "   XR Chest 2 View; Future  -     Complete PFT - Pre & Post Bronchodilator; Future  -     CBC & Differential; Future  -     Alpha - 1 - Antitrypsin; Future    2. DEVIN (obstructive sleep apnea)  -     Home Sleep Study; Future  -     XR Chest 2 View; Future  -     Complete PFT - Pre & Post Bronchodilator; Future  -     CBC & Differential; Future  -     Alpha - 1 - Antitrypsin; Future    3. Morbid (severe) obesity due to excess calories  -     Home Sleep Study; Future  -     XR Chest 2 View; Future  -     Complete PFT - Pre & Post Bronchodilator; Future  -     CBC & Differential; Future  -     Alpha - 1 - Antitrypsin; Future         Result Review :       Data reviewed : Old records      Discussion/ Recommendations:   Patient is strongly advised to reduce weight her BMI is 47.65  I strongly suspect sleep apnea will order sleep study  Symbicort inhaler every 12 2 puffs  Albuterol inhaler 2 puffs every 6 hours as needed  Singulair daily  CBC differential with alpha-1 antitrypsin level  Will order PFT  Regular exercise  Discussed vaccination and recommended    Class 3 Severe Obesity (BMI >=40). Obesity-related health conditions include the following: obstructive sleep apnea. Obesity is unchanged. BMI is is above average; BMI management plan is completed. We discussed low calorie, low carb based diet program, portion control, and increasing exercise.           Return in about 3 months (around 11/20/2024).      Thank you for allowing me to participate in the care of Yandel Dean. Please do not hesitate to contact me with any questions.         This document has been electronically signed by Babar Lauren MD on August 20, 2024 09:14 EDT

## 2024-08-29 ENCOUNTER — HOSPITAL ENCOUNTER (OUTPATIENT)
Dept: GENERAL RADIOLOGY | Facility: HOSPITAL | Age: 39
Discharge: HOME | End: 2024-08-29
Payer: COMMERCIAL

## 2024-08-29 ENCOUNTER — LAB (OUTPATIENT)
Dept: LAB | Facility: HOSPITAL | Age: 39
End: 2024-08-29
Payer: COMMERCIAL

## 2024-08-29 DIAGNOSIS — E66.01 MORBID (SEVERE) OBESITY DUE TO EXCESS CALORIES: ICD-10-CM

## 2024-08-29 DIAGNOSIS — G47.33 OSA (OBSTRUCTIVE SLEEP APNEA): ICD-10-CM

## 2024-08-29 DIAGNOSIS — J45.40 MODERATE PERSISTENT ASTHMA, UNSPECIFIED WHETHER COMPLICATED: ICD-10-CM

## 2024-08-29 LAB
ALPHA1 GLOB MFR UR ELPH: 132 MG/DL (ref 90–200)
BASOPHILS # BLD AUTO: 0 10*3/MM3 (ref 0–0.2)
BASOPHILS NFR BLD AUTO: 0 % (ref 0–1.5)
DEPRECATED RDW RBC AUTO: 42.1 FL (ref 37–54)
EOSINOPHIL # BLD AUTO: 0 10*3/MM3 (ref 0–0.4)
EOSINOPHIL NFR BLD AUTO: 0 % (ref 0.3–6.2)
ERYTHROCYTE [DISTWIDTH] IN BLOOD BY AUTOMATED COUNT: 12.9 % (ref 12.3–15.4)
HCT VFR BLD AUTO: 42.2 % (ref 34–46.6)
HGB BLD-MCNC: 14.7 G/DL (ref 12–15.9)
IMM GRANULOCYTES # BLD AUTO: 0.02 10*3/MM3 (ref 0–0.05)
IMM GRANULOCYTES NFR BLD AUTO: 0.4 % (ref 0–0.5)
LYMPHOCYTES # BLD AUTO: 1.93 10*3/MM3 (ref 0.7–3.1)
LYMPHOCYTES NFR BLD AUTO: 35 % (ref 19.6–45.3)
MCH RBC QN AUTO: 31.8 PG (ref 26.6–33)
MCHC RBC AUTO-ENTMCNC: 34.8 G/DL (ref 31.5–35.7)
MCV RBC AUTO: 91.3 FL (ref 79–97)
MONOCYTES # BLD AUTO: 0.44 10*3/MM3 (ref 0.1–0.9)
MONOCYTES NFR BLD AUTO: 8 % (ref 5–12)
NEUTROPHILS NFR BLD AUTO: 3.12 10*3/MM3 (ref 1.7–7)
NEUTROPHILS NFR BLD AUTO: 56.6 % (ref 42.7–76)
NRBC BLD AUTO-RTO: 0 /100 WBC (ref 0–0.2)
PLATELET # BLD AUTO: 243 10*3/MM3 (ref 140–450)
PMV BLD AUTO: 10.5 FL (ref 6–12)
RBC # BLD AUTO: 4.62 10*6/MM3 (ref 3.77–5.28)
WBC NRBC COR # BLD AUTO: 5.51 10*3/MM3 (ref 3.4–10.8)

## 2024-08-29 PROCEDURE — 85025 COMPLETE CBC W/AUTO DIFF WBC: CPT

## 2024-08-29 PROCEDURE — 82103 ALPHA-1-ANTITRYPSIN TOTAL: CPT

## 2024-08-29 PROCEDURE — 36415 COLL VENOUS BLD VENIPUNCTURE: CPT

## 2024-08-29 PROCEDURE — 71046 X-RAY EXAM CHEST 2 VIEWS: CPT

## 2024-10-07 RX ORDER — PROMETHAZINE HYDROCHLORIDE 25 MG/1
SUPPOSITORY RECTAL
Qty: 3 SUPPOSITORY | OUTPATIENT
Start: 2024-10-07

## 2024-11-04 ENCOUNTER — HOSPITAL ENCOUNTER (OUTPATIENT)
Dept: RESPIRATORY THERAPY | Facility: HOSPITAL | Age: 39
Discharge: HOME | End: 2024-11-04
Admitting: INTERNAL MEDICINE
Payer: COMMERCIAL

## 2024-11-04 ENCOUNTER — HOSPITAL ENCOUNTER (OUTPATIENT)
Dept: SLEEP MEDICINE | Facility: HOSPITAL | Age: 39
End: 2024-11-04
Payer: COMMERCIAL

## 2024-11-04 DIAGNOSIS — E66.01 MORBID (SEVERE) OBESITY DUE TO EXCESS CALORIES: ICD-10-CM

## 2024-11-04 DIAGNOSIS — J45.40 MODERATE PERSISTENT ASTHMA, UNSPECIFIED WHETHER COMPLICATED: ICD-10-CM

## 2024-11-04 DIAGNOSIS — G47.33 OSA (OBSTRUCTIVE SLEEP APNEA): ICD-10-CM

## 2024-11-04 PROCEDURE — 94726 PLETHYSMOGRAPHY LUNG VOLUMES: CPT

## 2024-11-04 PROCEDURE — 94729 DIFFUSING CAPACITY: CPT

## 2024-11-04 PROCEDURE — G0399 HOME SLEEP TEST/TYPE 3 PORTA: HCPCS

## 2024-11-04 PROCEDURE — 94060 EVALUATION OF WHEEZING: CPT

## 2024-11-04 RX ORDER — ALBUTEROL SULFATE 0.83 MG/ML
2.5 SOLUTION RESPIRATORY (INHALATION) ONCE
Status: COMPLETED | OUTPATIENT
Start: 2024-11-04 | End: 2024-11-04

## 2024-11-04 RX ADMIN — ALBUTEROL SULFATE 2.5 MG: 2.5 SOLUTION RESPIRATORY (INHALATION) at 07:54

## 2024-11-19 ENCOUNTER — OFFICE VISIT (OUTPATIENT)
Dept: PULMONOLOGY | Facility: CLINIC | Age: 39
End: 2024-11-19
Payer: COMMERCIAL

## 2024-11-19 VITALS
OXYGEN SATURATION: 97 % | WEIGHT: 293 LBS | HEIGHT: 68 IN | BODY MASS INDEX: 44.41 KG/M2 | RESPIRATION RATE: 16 BRPM | TEMPERATURE: 97.8 F | HEART RATE: 106 BPM | SYSTOLIC BLOOD PRESSURE: 113 MMHG | DIASTOLIC BLOOD PRESSURE: 80 MMHG

## 2024-11-19 DIAGNOSIS — G47.33 OSA (OBSTRUCTIVE SLEEP APNEA): ICD-10-CM

## 2024-11-19 DIAGNOSIS — E66.01 MORBID (SEVERE) OBESITY DUE TO EXCESS CALORIES: ICD-10-CM

## 2024-11-19 DIAGNOSIS — J45.40 MODERATE PERSISTENT ASTHMA, UNSPECIFIED WHETHER COMPLICATED: Primary | ICD-10-CM

## 2024-11-19 PROCEDURE — 1160F RVW MEDS BY RX/DR IN RCRD: CPT | Performed by: INTERNAL MEDICINE

## 2024-11-19 PROCEDURE — 1159F MED LIST DOCD IN RCRD: CPT | Performed by: INTERNAL MEDICINE

## 2024-11-19 PROCEDURE — 99214 OFFICE O/P EST MOD 30 MIN: CPT | Performed by: INTERNAL MEDICINE

## 2024-11-19 RX ORDER — OLANZAPINE 10 MG/1
10 TABLET ORAL NIGHTLY
COMMUNITY

## 2024-11-19 RX ORDER — PRAZOSIN HYDROCHLORIDE 2 MG/1
2 CAPSULE ORAL NIGHTLY
COMMUNITY
Start: 2024-11-13

## 2024-11-19 RX ORDER — CEPHALEXIN 750 MG/1
1 CAPSULE ORAL EVERY 12 HOURS SCHEDULED
COMMUNITY
Start: 2024-11-14

## 2024-11-19 RX ORDER — DESVENLAFAXINE 25 MG/1
1 TABLET, EXTENDED RELEASE ORAL DAILY
COMMUNITY

## 2024-11-19 NOTE — PROGRESS NOTES
Pulmonary Office Follow-up    Subjective     Yandel Dean is seen today at the office for   Chief Complaint   Patient presents with    Asthma    Sleep Apnea    Follow-up     3 month    Results         HPI  Yandel Dean is a 39 y.o. female with a PMH significant for asthma and obesity presents for follow-up patient has been doing good and has been trying to be more active she has had a sleep study done she is using her Symbicort inhaler and her breathing has improved      Tobacco use history:  Former smoker      Patient Active Problem List   Diagnosis    Asthma    Hypertriglyceridemia    Systemic lupus erythematosus    Female dyspareunia    Acute vaginitis    DEVIN (obstructive sleep apnea)    Equinus contracture of ankle       Review of Systems  Review of Systems   Respiratory:  Positive for shortness of breath.    All other systems reviewed and are negative.    As described in the HPI. Otherwise, remainder of ROS (14 systems) were negative.    Medications, Allergies, Social, and Family Histories reviewed as per EMR.    Result Review :            Objective     Vitals:    11/19/24 1516   BP: 113/80   Pulse: 106   Resp: 16   Temp: 97.8 °F (36.6 °C)   SpO2: 97%         11/19/24  1516   Weight: (!) 145 kg (319 lb 12.8 oz)       Physical Exam  Vitals and nursing note reviewed.   Constitutional:       Appearance: She is obese.   HENT:      Head: Normocephalic and atraumatic.      Nose: Nose normal.      Mouth/Throat:      Mouth: Mucous membranes are moist.      Pharynx: Oropharynx is clear.   Eyes:      Extraocular Movements: Extraocular movements intact.      Conjunctiva/sclera: Conjunctivae normal.      Pupils: Pupils are equal, round, and reactive to light.   Cardiovascular:      Rate and Rhythm: Normal rate and regular rhythm.      Pulses: Normal pulses.      Heart sounds: Normal heart sounds.   Pulmonary:      Effort: Pulmonary effort is normal.      Breath sounds: Normal breath sounds.   Abdominal:      General:  Abdomen is flat. Bowel sounds are normal.      Palpations: Abdomen is soft.   Musculoskeletal:         General: Normal range of motion.      Cervical back: Normal range of motion and neck supple.   Skin:     General: Skin is warm.      Capillary Refill: Capillary refill takes 2 to 3 seconds.   Neurological:      General: No focal deficit present.      Mental Status: She is alert and oriented to person, place, and time.   Psychiatric:         Mood and Affect: Mood normal.         Behavior: Behavior normal.         XR Chest 2 View    Result Date: 8/29/2024  Impression: 1. No acute cardiopulmonary abnormality. Electronically Signed: Mahesh Hyman  8/29/2024 10:10 AM EDT  Workstation ID: [id number]      Assessment & Plan     Diagnoses and all orders for this visit:    1. Moderate persistent asthma, unspecified whether complicated (Primary)  -     PAP Therapy    2. DEVIN (obstructive sleep apnea)  -     PAP Therapy    3. Morbid (severe) obesity due to excess calories         Discussion/ Recommendations:   PFT was within normal limits  Sleep study shows mild obstructive sleep apnea  Will start her on auto CPAP at a pressure of 8-16  Patient is advised to reduce weight and regular exercise her BMI is 48.63  Vaccinations discussed and recommended             Return in about 4 months (around 3/19/2025).          This document has been electronically signed by Babar Lauren MD on November 19, 2024 15:25 EST

## 2024-12-19 ENCOUNTER — HOSPITAL ENCOUNTER (EMERGENCY)
Facility: HOSPITAL | Age: 39
Discharge: HOME | End: 2024-12-19
Attending: EMERGENCY MEDICINE
Payer: COMMERCIAL

## 2024-12-19 VITALS
RESPIRATION RATE: 22 BRPM | OXYGEN SATURATION: 98 % | HEIGHT: 68 IN | WEIGHT: 293 LBS | HEART RATE: 94 BPM | SYSTOLIC BLOOD PRESSURE: 90 MMHG | DIASTOLIC BLOOD PRESSURE: 57 MMHG | BODY MASS INDEX: 44.41 KG/M2 | TEMPERATURE: 97.7 F

## 2024-12-19 DIAGNOSIS — F32.A DEPRESSION, UNSPECIFIED DEPRESSION TYPE: ICD-10-CM

## 2024-12-19 DIAGNOSIS — T50.902A INTENTIONAL OVERDOSE, INITIAL ENCOUNTER: Primary | ICD-10-CM

## 2024-12-19 LAB
ALBUMIN SERPL-MCNC: 4.6 G/DL (ref 3.5–5.2)
ALBUMIN/GLOB SERPL: 1.5 G/DL
ALP SERPL-CCNC: 121 U/L (ref 39–117)
ALT SERPL W P-5'-P-CCNC: 92 U/L (ref 1–33)
AMPHET+METHAMPHET UR QL: NEGATIVE
AMPHETAMINES UR QL: NEGATIVE
ANION GAP SERPL CALCULATED.3IONS-SCNC: 14.7 MMOL/L (ref 5–15)
APAP SERPL-MCNC: <5 MCG/ML (ref 0–30)
AST SERPL-CCNC: 83 U/L (ref 1–32)
BARBITURATES UR QL SCN: NEGATIVE
BASOPHILS # BLD AUTO: 0.01 10*3/MM3 (ref 0–0.2)
BASOPHILS NFR BLD AUTO: 0.1 % (ref 0–1.5)
BENZODIAZ UR QL SCN: NEGATIVE
BILIRUB SERPL-MCNC: 0.5 MG/DL (ref 0–1.2)
BUN SERPL-MCNC: 9 MG/DL (ref 6–20)
BUN/CREAT SERPL: 9.9 (ref 7–25)
BUPRENORPHINE SERPL-MCNC: NEGATIVE NG/ML
CALCIUM SPEC-SCNC: 9.9 MG/DL (ref 8.6–10.5)
CANNABINOIDS SERPL QL: POSITIVE
CHLORIDE SERPL-SCNC: 100 MMOL/L (ref 98–107)
CO2 SERPL-SCNC: 21.3 MMOL/L (ref 22–29)
COCAINE UR QL: NEGATIVE
CREAT SERPL-MCNC: 0.91 MG/DL (ref 0.57–1)
DEPRECATED RDW RBC AUTO: 43 FL (ref 37–54)
EGFRCR SERPLBLD CKD-EPI 2021: 83.5 ML/MIN/1.73
EOSINOPHIL # BLD AUTO: 0 10*3/MM3 (ref 0–0.4)
EOSINOPHIL NFR BLD AUTO: 0 % (ref 0.3–6.2)
ERYTHROCYTE [DISTWIDTH] IN BLOOD BY AUTOMATED COUNT: 13.2 % (ref 12.3–15.4)
ETHANOL BLD-MCNC: 18 MG/DL (ref 0–10)
ETHANOL UR QL: 0.02 %
FENTANYL UR-MCNC: NEGATIVE NG/ML
GLOBULIN UR ELPH-MCNC: 3.1 GM/DL
GLUCOSE SERPL-MCNC: 124 MG/DL (ref 65–99)
HCT VFR BLD AUTO: 38.8 % (ref 34–46.6)
HGB BLD-MCNC: 12.9 G/DL (ref 12–15.9)
HOLD SPECIMEN: NORMAL
HOLD SPECIMEN: NORMAL
IMM GRANULOCYTES # BLD AUTO: 0.03 10*3/MM3 (ref 0–0.05)
IMM GRANULOCYTES NFR BLD AUTO: 0.4 % (ref 0–0.5)
LYMPHOCYTES # BLD AUTO: 2.56 10*3/MM3 (ref 0.7–3.1)
LYMPHOCYTES NFR BLD AUTO: 34.4 % (ref 19.6–45.3)
MCH RBC QN AUTO: 30.1 PG (ref 26.6–33)
MCHC RBC AUTO-ENTMCNC: 33.2 G/DL (ref 31.5–35.7)
MCV RBC AUTO: 90.7 FL (ref 79–97)
METHADONE UR QL SCN: NEGATIVE
MONOCYTES # BLD AUTO: 0.45 10*3/MM3 (ref 0.1–0.9)
MONOCYTES NFR BLD AUTO: 6 % (ref 5–12)
NEUTROPHILS NFR BLD AUTO: 4.39 10*3/MM3 (ref 1.7–7)
NEUTROPHILS NFR BLD AUTO: 59.1 % (ref 42.7–76)
NRBC BLD AUTO-RTO: 0 /100 WBC (ref 0–0.2)
OPIATES UR QL: NEGATIVE
OXYCODONE UR QL SCN: NEGATIVE
PCP UR QL SCN: NEGATIVE
PLATELET # BLD AUTO: 213 10*3/MM3 (ref 140–450)
PMV BLD AUTO: 10.6 FL (ref 6–12)
POTASSIUM SERPL-SCNC: 4.2 MMOL/L (ref 3.5–5.2)
PROT SERPL-MCNC: 7.7 G/DL (ref 6–8.5)
QT INTERVAL: 348 MS
QTC INTERVAL: 446 MS
RBC # BLD AUTO: 4.28 10*6/MM3 (ref 3.77–5.28)
SALICYLATES SERPL-MCNC: <0.3 MG/DL
SODIUM SERPL-SCNC: 136 MMOL/L (ref 136–145)
TRICYCLICS UR QL SCN: NEGATIVE
WBC NRBC COR # BLD AUTO: 7.44 10*3/MM3 (ref 3.4–10.8)
WHOLE BLOOD HOLD COAG: NORMAL
WHOLE BLOOD HOLD SPECIMEN: NORMAL

## 2024-12-19 PROCEDURE — 80143 DRUG ASSAY ACETAMINOPHEN: CPT | Performed by: EMERGENCY MEDICINE

## 2024-12-19 PROCEDURE — 80053 COMPREHEN METABOLIC PANEL: CPT | Performed by: EMERGENCY MEDICINE

## 2024-12-19 PROCEDURE — 93005 ELECTROCARDIOGRAM TRACING: CPT | Performed by: EMERGENCY MEDICINE

## 2024-12-19 PROCEDURE — 99285 EMERGENCY DEPT VISIT HI MDM: CPT

## 2024-12-19 PROCEDURE — 80179 DRUG ASSAY SALICYLATE: CPT | Performed by: EMERGENCY MEDICINE

## 2024-12-19 PROCEDURE — 80307 DRUG TEST PRSMV CHEM ANLYZR: CPT | Performed by: EMERGENCY MEDICINE

## 2024-12-19 PROCEDURE — 85025 COMPLETE CBC W/AUTO DIFF WBC: CPT

## 2024-12-19 PROCEDURE — 82077 ASSAY SPEC XCP UR&BREATH IA: CPT | Performed by: EMERGENCY MEDICINE

## 2024-12-19 RX ORDER — ERGOCALCIFEROL 1.25 MG/1
50000 CAPSULE, LIQUID FILLED ORAL WEEKLY
COMMUNITY
Start: 2024-12-12

## 2024-12-19 RX ORDER — BUPROPION HYDROCHLORIDE 150 MG/1
150 TABLET ORAL EVERY MORNING
COMMUNITY
Start: 2024-12-11

## 2024-12-19 RX ORDER — PREGABALIN 150 MG/1
150 CAPSULE ORAL DAILY
COMMUNITY
Start: 2024-11-20

## 2024-12-19 RX ORDER — SODIUM CHLORIDE 0.9 % (FLUSH) 0.9 %
10 SYRINGE (ML) INJECTION AS NEEDED
Status: DISCONTINUED | OUTPATIENT
Start: 2024-12-19 | End: 2024-12-20 | Stop reason: HOSPADM

## 2024-12-19 RX ORDER — DESVENLAFAXINE 100 MG/1
2 TABLET, EXTENDED RELEASE ORAL DAILY
COMMUNITY

## 2024-12-19 RX ORDER — ATORVASTATIN CALCIUM 20 MG/1
20 TABLET, FILM COATED ORAL NIGHTLY
COMMUNITY
Start: 2024-12-12

## 2024-12-19 RX ORDER — NAPROXEN 500 MG/1
500 TABLET ORAL EVERY 12 HOURS PRN
COMMUNITY
Start: 2024-12-10

## 2024-12-19 RX ORDER — PRAZOSIN HYDROCHLORIDE 2 MG/1
1 CAPSULE ORAL NIGHTLY
COMMUNITY

## 2024-12-20 NOTE — ED PROVIDER NOTES
Time: 7:04 PM EST  Date of encounter:  12/19/2024  Independent Historian/Clinical History and Information was obtained by:   Patient  Chief Complaint: Reported overdose    History is limited by: N/A    History of Present Illness:  Patient is a 39 y.o. year old female who presents to the emergency department for evaluation of reported overdose.  The patient notes that she took approximately 10-17 Xanax around 3:00.  She states that she essentially just want to go to sleep for a long period of time as she is tired of dealing with her pain from fibromyalgia.  The patient also notes she has a long history of depression, bipolar and PTSD.  She also notes that she has had some recent stress in her life as her daughter typically lives with her mother and misery has moved up with her since Thanksgiving.  She states this is also created a lot of stress.  She also notes that she lives with her boyfriend who currently is  and his wife lives in the house as well.  She states that this is a very abnormal living environment.  She does note that she has been drinking more often but does not consider herself an alcoholic.  She states that she did drink today.  She does note some nausea and some fatigue.  She notes her chronic myalgias.  Denies a headache.  She has not been vomiting.  She is not short of breath.  She denies any abdominal pain.  She states that she does see a therapist but does not feel like the therapist is doing thing for her.  She states that she has been admitted to a psychiatric facility but it was many many years ago.    HPI    Patient Care Team  Primary Care Provider: Luz Maria Phillips APRN    Past Medical History:     Allergies   Allergen Reactions    Penicillins Anaphylaxis    Sulfa Antibiotics Anaphylaxis    Sulfasalazine Provider Review Needed     Past Medical History:   Diagnosis Date    Abnormal Pap smear of cervix     Acid reflux     Ankle pain, left     Asthma     USES INHALERS    Bipolar  disorder     Depression     Elevated cholesterol     H/O Enlarged tonsils     HPV (human papilloma virus) infection     Hypertension     Hypoglycemia     Lupus     Migraine     Ovarian cyst     PTSD (post-traumatic stress disorder)     no issues waking from anesthesia, would like to be told before she is touch    Recurrent streptococcal tonsillitis      Past Surgical History:   Procedure Laterality Date    ACHILLES TENDON SURGERY Left 01/16/2024    Procedure: LEFT ACHILLES TENDON LENGTHENING, LEFT GASTROCNEMIUS LENGTHENING;  Surgeon: Adriel Fitzgerald DPM;  Location: [facility] MAIN OR;  Service: Podiatry;  Laterality: Left;    ANKLE SURGERY Left 06/2021    HYSTERECTOMY  2018    abnormal pap smears, dysplasia-out of state    LEG SURGERY Left     plate, juan, bolts and screws from knee to ankle    TONSILLECTOMY AND ADENOIDECTOMY Bilateral 05/26/2023    Procedure: TONSILLECTOMY AND NASOPHARYNGOSCOPY;  Surgeon: Wilner Montero MD;  Location: [facility] MAIN OR;  Service: ENT;  Laterality: Bilateral;     Family History   Problem Relation Age of Onset    Breast cancer Maternal Grandmother 34    Ovarian cancer Maternal Great-Grandmother 38    Uterine cancer Neg Hx     Cervical cancer Neg Hx     Colon cancer Neg Hx     Stomach cancer Neg Hx     Skin cancer Neg Hx        Home Medications:  Prior to Admission medications    Medication Sig Start Date End Date Taking? Authorizing Provider   albuterol sulfate  (90 Base) MCG/ACT inhaler Every 6 (Six) Hours. 12/14/22   Etienne Lamar MD   Albuterol Sulfate, sensor, 108 (90 Base) MCG/ACT aerosol powder  Inhale 2 puffs every 4 hours by inhalation route.  Patient not taking: Reported on 11/19/2024    Etienne Lamar MD   ALPRAZolam (XANAX) 0.5 MG tablet Take 1 tablet by mouth 3 (Three) Times a Day As Needed. for anxiety 11/8/23   Etienne Lamar MD   amitriptyline (ELAVIL) 10 MG tablet Take 1 tablet by mouth every night at bedtime.    Etienne Lamar MD    atorvastatin (LIPITOR) 10 MG tablet Take 1 tablet by mouth Every Night.  Patient not taking: Reported on 11/19/2024 1/25/23   Etienne Lamar MD   azithromycin (Zithromax Z-Shabbir) 250 MG tablet Take 2 tablets by mouth on day 1, then 1 tablet daily on days 2-5 11/24/24   Fiona Benito MD   benzonatate (TESSALON) 100 MG capsule Take 1 capsule by mouth 3 (Three) Times a Day As Needed for Cough. 8/2/24   Etienne Lamar MD   cephalexin (KEFLEX) 750 MG capsule Take 1 capsule by mouth Every 12 (Twelve) Hours. 11/14/24   Etienne Lamar MD   cetirizine (zyrTEC) 10 MG tablet Take 1 tablet by mouth Daily.    Etienne Lamar MD   cholecalciferol (VITAMIN D3) 250 MCG (06855 UT) capsule Take 1 capsule by mouth 1 (One) Time Per Week. 11/2/24   Etienne Lamar MD   desvenlafaxine (PRISTIQ) 100 MG 24 hr tablet Take 1 tablet by mouth Daily.    Etienne Lamar MD   desvenlafaxine (PRISTIQ) 50 MG 24 hr tablet Take 1 tablet by mouth Daily. 7/16/24   Etienne Lamar MD   Desvenlafaxine Succinate ER 25 MG tablet sustained-release 24 hour Take 1 tablet by mouth Daily.    Etienne Lamar MD   lamoTRIgine (LaMICtal) 25 MG tablet TAKE 1 TABLET BY MOUTH AT BEDTIME FOR 2 WEEKS. INCREASE TO 2 TABLETS AT BEDTIME 4/1/24   Etienne Lamar MD   levocetirizine (XYZAL) 5 MG tablet Take 1 tablet by mouth Every Evening.    Etienne Lamar MD   lisinopril (PRINIVIL,ZESTRIL) 10 MG tablet Take 1 tablet by mouth Daily. 7/12/24   Etienne Lamar MD   Methotrexate (XATMEP PO) 2.5 mg daily  Patient not taking: Reported on 11/19/2024    Etienne Lamar MD   metroNIDAZOLE (METROGEL) 0.75 % gel APPLY 1 GRAM TOPICALLY TO FACE TWICE DAILY 2/26/24   Etienne Lamar MD   montelukast (SINGULAIR) 10 MG tablet Take 1 tablet by mouth Every Evening.  Patient not taking: Reported on 11/19/2024    Etienne Lamar MD   OLANZapine (zyPREXA) 10 MG tablet Take 1 tablet by mouth Every  Night.  Patient not taking: Reported on 2024    Etienne Lamar MD   OLANZapine (zyPREXA) 15 MG tablet Take 1 tablet by mouth every night at bedtime.    Etienne Lamar MD   omeprazole (priLOSEC) 40 MG capsule Take 1 capsule by mouth Daily.    Etienne Lamar MD   ondansetron ODT (ZOFRAN-ODT) 4 MG disintegrating tablet DISSOLVE 1 TABLET ON THE TONGUE THREE TIMES DAILY FOR 7 DAYS  Patient not taking: Reported on 2024    Etienne Lamar MD   prazosin (MINIPRESS) 2 MG capsule Take 1 capsule by mouth Every Night.  Patient not taking: Reported on 2024   Etienne Lamar MD   pregabalin (LYRICA) 100 MG capsule TAKE 1 CAPSULE BY MOUTH TWICE DAILY AS DIRECTED    Etienne Lamar MD   promethazine (PHENERGAN) 25 MG tablet Take 1 tablet by mouth Every 6 (Six) Hours As Needed for Nausea or Vomiting.  Patient not taking: Reported on 2024   Adriel Fitzgerald DPM   Robitussin Cough+Chest Neftaly DM  MG/20ML liquid Take 10 mL by mouth.  Patient not taking: Reported on 2024   Etienne Lamar MD   Symbicort 80-4.5 MCG/ACT inhaler INHALE 2 PUFFS BY MOUTH TWICE DAILY AS DIRECTED 3/7/24   Etienne Lamar MD   ziprasidone (GEODON) 80 MG capsule Take 1 capsule by mouth 2 (Two) Times a Day With Meals. 23   Etienne Lamar MD        Social History:   Social History     Tobacco Use    Smoking status: Former     Current packs/day: 0.00     Types: Cigarettes     Quit date:      Years since quittin.9     Passive exposure: Never    Smokeless tobacco: Never   Vaping Use    Vaping status: Never Used   Substance Use Topics    Alcohol use: Yes     Comment: everyday    Drug use: Yes     Types: Heroin     Comment: delta 8 gummies         Review of Systems:  Review of Systems   Constitutional:  Positive for fatigue. Negative for chills, diaphoresis and fever.   HENT:  Negative for congestion, postnasal drip, rhinorrhea and sore  "throat.    Eyes:  Negative for photophobia.   Respiratory:  Negative for cough, chest tightness and shortness of breath.    Cardiovascular:  Negative for chest pain, palpitations and leg swelling.   Gastrointestinal:  Positive for nausea. Negative for abdominal pain, diarrhea and vomiting.   Genitourinary:  Negative for difficulty urinating, dysuria, flank pain, frequency, hematuria and urgency.   Musculoskeletal:  Positive for myalgias. Negative for neck pain and neck stiffness.   Skin:  Negative for pallor and rash.   Neurological:  Positive for weakness. Negative for dizziness, syncope, numbness and headaches.   Hematological:  Negative for adenopathy. Does not bruise/bleed easily.   Psychiatric/Behavioral: Negative.          Physical Exam:  BP 90/57   Pulse 94   Temp 97.7 °F (36.5 °C) (Oral)   Resp 22   Ht 172.7 cm (68\")   Wt (!) 144 kg (318 lb 5.5 oz)   SpO2 98%   BMI 48.40 kg/m²     Physical Exam  Vitals and nursing note reviewed.   Constitutional:       General: She is not in acute distress.     Appearance: Normal appearance. She is not ill-appearing, toxic-appearing or diaphoretic.   HENT:      Head: Normocephalic and atraumatic.      Mouth/Throat:      Mouth: Mucous membranes are moist.   Eyes:      Pupils: Pupils are equal, round, and reactive to light.   Cardiovascular:      Rate and Rhythm: Normal rate and regular rhythm.      Pulses: Normal pulses.           Carotid pulses are 2+ on the right side and 2+ on the left side.       Radial pulses are 2+ on the right side and 2+ on the left side.        Femoral pulses are 2+ on the right side and 2+ on the left side.       Popliteal pulses are 2+ on the right side and 2+ on the left side.        Dorsalis pedis pulses are 2+ on the right side and 2+ on the left side.        Posterior tibial pulses are 2+ on the right side and 2+ on the left side.      Heart sounds: Normal heart sounds. No murmur heard.  Pulmonary:      Effort: Pulmonary effort is " normal. No accessory muscle usage, respiratory distress or retractions.      Breath sounds: Normal breath sounds. No wheezing, rhonchi or rales.   Abdominal:      General: Abdomen is flat. There is no distension.      Palpations: Abdomen is soft. There is no mass or pulsatile mass.      Tenderness: There is no abdominal tenderness. There is no right CVA tenderness, left CVA tenderness, guarding or rebound.      Comments: No rigidity   Musculoskeletal:         General: No swelling, tenderness or deformity.      Cervical back: Neck supple. No tenderness.      Right lower leg: No edema.      Left lower leg: No edema.   Skin:     General: Skin is warm and dry.      Capillary Refill: Capillary refill takes less than 2 seconds.      Coloration: Skin is not jaundiced or pale.      Findings: No erythema.   Neurological:      General: No focal deficit present.      Mental Status: She is alert and oriented to person, place, and time. Mental status is at baseline.      Cranial Nerves: Cranial nerves 2-12 are intact. No cranial nerve deficit, dysarthria or facial asymmetry.      Sensory: Sensation is intact. No sensory deficit.      Motor: Motor function is intact. No weakness or pronator drift.      Coordination: Coordination is intact. Coordination normal.   Psychiatric:         Attention and Perception: Attention and perception normal. She is attentive. She does not perceive auditory or visual hallucinations.         Mood and Affect: Mood is depressed. Affect is not angry, tearful or inappropriate.         Speech: Speech is not slurred.         Behavior: Behavior normal. Behavior is not agitated, slowed, aggressive, withdrawn, hyperactive or combative. Behavior is cooperative.         Thought Content: Thought content is not paranoid or delusional. Thought content does not include suicidal ideation. Thought content does not include suicidal plan.         Cognition and Memory: Cognition and memory normal.         Judgment:  Judgment normal. Judgment is not impulsive or inappropriate.      Comments: The patient states that she just wanted to go to sleep.  She denies wanting to harm herself or die.  She has no further plans to hurt herself.                  Procedures:  Procedures      Medical Decision Making:      Comorbidities that affect care:    Asthma, depression, bipolar, ovarian cyst, PTSD, migraine, hyperlipidemia, GERD, hypertension    External Notes reviewed:    None      The following orders were placed and all results were independently analyzed by me:  Orders Placed This Encounter   Procedures    [place] Draw    Comprehensive Metabolic Panel    Acetaminophen Level    Ethanol    Salicylate Level    Urine Drug Screen - Urine, Clean Catch    CBC Auto Differential    Fentanyl, Urine - Urine, Clean Catch    NPO Diet NPO Type: Strict NPO    Continuous Pulse Oximetry    Vital Signs    Undress & Gown    Psych / Access to See    Inpatient [facility] Consult    Oxygen Therapy- Nasal Cannula; Titrate 1-6 LPM Per SpO2; 90 - 95%    POC Glucose Once    ECG 12 Lead Drug Monitoring    ECG 12 Lead Rhythm Change    Insert Peripheral IV    Suicide Precautions    Suicide Precautions    CBC & Differential    Green Top (Gel)    Lavender Top    Gold Top - SST    Light Blue Top       Medications Given in the Emergency Department:  Medications   sodium chloride 0.9 % flush 10 mL (has no administration in time range)        ED Course:    ED Course as of 12/19/24 2300   Thu Dec 19, 2024   1902 EKG:    Rhythm: Sinus rhythm.  However, lead V6 is not recording  Rate: 99  Intervals: Normal GA and QT interval.  QT interval 446 which is normal  Normal QRS duration at 92  T-wave: Isolated nonspecific T wave inversion in III  ST Segment: J-point elevation V5, I,II, III, no obvious pathological ST elevation or reciprocal ST depression to suggest STEMI    EKG Comparison: There is no change in the QRS and ST morphology from the EKG performed December 19, 2024.   At [facility].  The J-point elevation appears old.    Interpreted by me   [SD]      ED Course User Index  [SD] Adriel Brunner DO       Labs:    Lab Results (last 24 hours)       Procedure Component Value Units Date/Time    CBC & Differential [[id number]]  (Abnormal) Collected: 12/19/24 1834    Specimen: Blood Updated: 12/19/24 1840    Narrative:      The following orders were created for panel order CBC & Differential.  Procedure                               Abnormality         Status                     ---------                               -----------         ------                     CBC Auto Differential[[id number]]        Abnormal            Final result                 Please view results for these tests on the individual orders.    Comprehensive Metabolic Panel [[id number]]  (Abnormal) Collected: 12/19/24 1834    Specimen: Blood Updated: 12/19/24 1900     Glucose 124 mg/dL      BUN 9 mg/dL      Creatinine 0.91 mg/dL      Sodium 136 mmol/L      Potassium 4.2 mmol/L      Comment: Slight hemolysis detected by analyzer. Result may be falsely elevated.        Chloride 100 mmol/L      CO2 21.3 mmol/L      Calcium 9.9 mg/dL      Total Protein 7.7 g/dL      Albumin 4.6 g/dL      ALT (SGPT) 92 U/L      AST (SGOT) 83 U/L      Alkaline Phosphatase 121 U/L      Total Bilirubin 0.5 mg/dL      Globulin 3.1 gm/dL      A/G Ratio 1.5 g/dL      BUN/Creatinine Ratio 9.9     Anion Gap 14.7 mmol/L      eGFR 83.5 mL/min/1.73     Narrative:      GFR Categories in Chronic Kidney Disease (CKD)      GFR Category          GFR (mL/min/1.73)    Interpretation  G1                     90 or greater         Normal or high (1)  G2                      60-89                Mild decrease (1)  G3a                   45-59                Mild to moderate decrease  G3b                   30-44                Moderate to severe decrease  G4                    15-29                Severe decrease  G5                    14 or less           Kidney  failure          (1)In the absence of evidence of kidney disease, neither GFR category G1 or G2 fulfill the criteria for CKD.    eGFR calculation 2021 CKD-EPI creatinine equation, which does not include race as a factor    Acetaminophen Level [[id number]]  (Normal) Collected: 12/19/24 1834    Specimen: Blood Updated: 12/19/24 1900     Acetaminophen <5.0 mcg/mL     Ethanol [[id number]]  (Abnormal) Collected: 12/19/24 1834    Specimen: Blood Updated: 12/19/24 1900     Ethanol 18 mg/dL      Ethanol % 0.018 %     Narrative:      Ethanol (Plasma)  <10 Essentially Negative    Toxic Concentrations           mg/dL    Flushing, slowing of reflexes    Impaired visual activity         Depression of CNS              >100  Possible Coma                  >300       Salicylate Level [[id number]]  (Normal) Collected: 12/19/24 1834    Specimen: Blood Updated: 12/19/24 1900     Salicylate <0.3 mg/dL     CBC Auto Differential [[id number]]  (Abnormal) Collected: 12/19/24 1834    Specimen: Blood Updated: 12/19/24 1840     WBC 7.44 10*3/mm3      RBC 4.28 10*6/mm3      Hemoglobin 12.9 g/dL      Hematocrit 38.8 %      MCV 90.7 fL      MCH 30.1 pg      MCHC 33.2 g/dL      RDW 13.2 %      RDW-SD 43.0 fl      MPV 10.6 fL      Platelets 213 10*3/mm3      Neutrophil % 59.1 %      Lymphocyte % 34.4 %      Monocyte % 6.0 %      Eosinophil % 0.0 %      Basophil % 0.1 %      Immature Grans % 0.4 %      Neutrophils, Absolute 4.39 10*3/mm3      Lymphocytes, Absolute 2.56 10*3/mm3      Monocytes, Absolute 0.45 10*3/mm3      Eosinophils, Absolute 0.00 10*3/mm3      Basophils, Absolute 0.01 10*3/mm3      Immature Grans, Absolute 0.03 10*3/mm3      nRBC 0.0 /100 WBC     Urine Drug Screen - Urine, Clean Catch [[id number]]  (Abnormal) Collected: 12/19/24 1914    Specimen: Urine, Clean Catch Updated: 12/19/24 2017     THC, Screen, Urine Positive     Phencyclidine (PCP), Urine Negative     Cocaine Screen, Urine Negative     Methamphetamine, Ur  Negative     Opiate Screen Negative     Amphetamine Screen, Urine Negative     Benzodiazepine Screen, Urine Negative     Tricyclic Antidepressants Screen Negative     Methadone Screen, Urine Negative     Barbiturates Screen, Urine Negative     Oxycodone Screen, Urine Negative     Buprenorphine, Screen, Urine Negative    Narrative:      Cutoff For Drugs Screened:    Amphetamines               500 ng/ml  Barbiturates               200 ng/ml  Benzodiazepines            150 ng/ml  Cocaine                    150 ng/ml  Methadone                  200 ng/ml  Opiates                    100 ng/ml  Phencyclidine               25 ng/ml  THC                         50 ng/ml  Methamphetamine            500 ng/ml  Tricyclic Antidepressants  300 ng/ml  Oxycodone                  100 ng/ml  Buprenorphine               10 ng/ml    The normal value for all drugs tested is negative. This report includes unconfirmed screening results, with the cutoff values listed, to be used for medical treatment purposes only.  Unconfirmed results must not be used for non-medical purposes such as employment or legal testing.  Clinical consideration should be applied to any drug of abuse test, particularly when unconfirmed results are used.      Fentanyl, Urine - Urine, Clean Catch [[id number]]  (Normal) Collected: 12/19/24 1914    Specimen: Urine, Clean Catch Updated: 12/19/24 2023     Fentanyl, Urine Negative    Narrative:      Negative Threshold:      Fentanyl 5 ng/mL     The normal value for the drug tested is negative. This report includes final unconfirmed screening results to be used for medical treatment purposes only. Unconfirmed results must not be used for non-medical purposes such as employment or legal testing. Clinical consideration should be applied to any drug of abuse test, particularly when unconfirmed results are used.                    Imaging:    No Radiology Exams Resulted Within Past 24 Hours      Differential Diagnosis and  Discussion:    Psychiatric: Differential diagnosis includes but is not limited to depression, psychosis, bipolar disorder, anxiety, manic episode, schizophrenia, and substance abuse.    Labs were drawn in the emergency department and all labs were reviewed and interpreted by me.  An EKG was performed and the EKG was interpreted by me.    MDM  Number of Diagnoses or Management Options  Depression, unspecified depression type  Intentional overdose, initial encounter  Diagnosis management comments: Poison control was consulted in regards to the patient's Xanax overdose.  They are recommending supportive care only.  They do recommend an observation of 6-8 hours postingestion.  The patient states that she ingested the Xanax around 3:00.      A toxicology screen was performed today.  There were no abnormal substances found or that could not be explained by the patient's medications.  Patient did have THC in the urine but she does admit that she uses a THC Gummies which is illegal.  The patient and/or caregiver does understand that there can be false negatives as well as false positives to the screening tests    Urine toxicology screen was negative for benzodiazepines.  It is unlikely that the patient overdosed on the Xanax as self-reported    The patient's CMP was reviewed and shows no abnormalities of critical concern.  Of note, the patient's sodium and potassium are acceptable.  The patient's liver enzymes are unremarkable.  The patient's renal function including creatinine is preserved.  The patient has a normal anion gap.    The patient's CBC was reviewed and shows no abnormalities of critical concern.  Of note, there is no anemia requiring a blood transfusion and the platelet count is acceptable    Tylenol and salicylate levels were undetectable    The patient's alcohol level was 18 which is not considered to be intoxicated    The patient's EKG demonstrated a normal sinus rhythm.  The EKG demonstrated no evidence of  dysrhythmia.  The patient had no acute ST abnormalities or acute T wave abnormalities to indicate STEMI            The patient was observed in the emergency room until almost 11 PM at the time of discharge.  At the time of discharge the patient was awake alert and orient x 3 cooperative and pleasant.  The patient's speech was clear.  The patient's vital signs were stable.  The patient was evaluated by Anastacia from [facility].  She felt the patient had no suicidal ideation.  She had no homicidal ideation.  She had no acute psychosis.  She feels patient can be discharged home with outpatient follow-up.  I had a very long conversation with the patient.  She states that she is absolutely not suicidal.  She does not feel like she is going to harm herself.  The patient's boyfriend Olu Edwards is at bedside.  He states that he feels very comfortable taking her home.  He feels the patient's suicidal risk is very low.  He states that he is actually going to take all of her medication and locked them up and then administer himself.  He will stay with the patient until the patient is seen by Aurora from [facility].  They are going to call the office first thing in the morning.  The patient consents for safety.  The patient states that should she have that she may hurt herself worsening depression or any other symptoms she would return back to the emergency medially.  Patient appears appropriate for discharge and outpatient follow-up and will return if necessary       Amount and/or Complexity of Data Reviewed  Clinical lab tests: reviewed  Tests in the medicine section of CPT®: reviewed             Social Determinants of Health:    Patient is independent, reliable, and has access to care.       Disposition and Care Coordination:    Discharged: The patient is suitable and stable for discharge with no need for consideration of admission.    I have explained discharge medications and the need for follow up with the  patient/caretakers. This was also printed in the discharge instructions. Patient was discharged with the following medications and follow up:      Medication List      No changes were made to your prescriptions during this visit.      Aurora From [facility]    On 12/20/2024  Depression       Final diagnoses:   Intentional overdose, initial encounter   Depression, unspecified depression type        ED Disposition       ED Disposition   Discharge    Condition   Stable    Comment   --               This medical record created using voice recognition software.             Adriel Brunner DO  12/20/24 3085

## 2024-12-20 NOTE — DISCHARGE INSTRUCTIONS
Please stay with Mr. Edwards till you are evaluated by Aurora from [facility] tomorrow.    Please call back and from intensive [facility] first thing in the morning to set up an emergency meeting.    Please return to the emergency immediately should you have any feelings of worsening depression, suicidal ideation, suicidal plan, altered mental status, seizure or any new symptoms you are concerned with

## 2024-12-20 NOTE — ED NOTES
PATIENT REPORTS TAKING APPROXIMATELY 18 XANAX AROUND 3 PM.    CALLED POISON CONTROL AT 1840.    POISON CONTROL RECOMMENDED MONITORING THE PT FOR 6-8 HOURS AND ADMINISTERING FLUIDS IF THE PATIENT BECOMES HYPOTENSIVE.    POISON CONTROL DOES NOT RECOMMEND REVERSING BENZOS DUE TO RISK OF SEIZURES.    POISON CONTROL SAYS CNS DEPRESSION IS TO BE EXPECTED (DROWSINESS, CONFUSION ETC.).    SPOKE WITH DR. VIRGEN.    EKG OBTAINED. IV ACCESS AND LABS OBTAINED. PATIENT ON CONTINUOUS CARDIAC MONITORING AT THIS TIME.

## 2025-01-20 LAB
QT INTERVAL: 348 MS
QTC INTERVAL: 446 MS

## 2025-01-22 ENCOUNTER — OFFICE VISIT (OUTPATIENT)
Dept: CARDIOLOGY | Facility: CLINIC | Age: 40
End: 2025-01-22
Payer: COMMERCIAL

## 2025-01-22 VITALS
BODY MASS INDEX: 44.41 KG/M2 | HEIGHT: 68 IN | HEART RATE: 94 BPM | DIASTOLIC BLOOD PRESSURE: 95 MMHG | SYSTOLIC BLOOD PRESSURE: 136 MMHG | WEIGHT: 293 LBS

## 2025-01-22 DIAGNOSIS — R07.9 CHEST PAIN, UNSPECIFIED TYPE: Primary | ICD-10-CM

## 2025-01-22 DIAGNOSIS — I10 PRIMARY HYPERTENSION: ICD-10-CM

## 2025-01-22 DIAGNOSIS — E78.2 MIXED HYPERLIPIDEMIA: ICD-10-CM

## 2025-01-22 PROCEDURE — 99204 OFFICE O/P NEW MOD 45 MIN: CPT | Performed by: INTERNAL MEDICINE

## 2025-01-22 NOTE — PROGRESS NOTES
Chief Complaint  Chest Pain and Establish Care    Subjective        Yandel Dean presents to [facility] CARDIOLOGY  History of present illness:    Patient is a 39-year-old female who presents with left-sided chest pain that goes around to her back.  It mainly occurs when she is sitting and lying down.  It would last for a few minutes.  When she is walking around she does not notice it as much.  She notes no nausea or vomiting.  She does note a little bit of lightheadedness and possible disorientation.  She describes it as sharp and painful like somebody is pushing a hot juan into the skin.  When it occurs she will get up and move around and it will go away.  She notes a remote tobacco history.  Rare alcohol.  Mother and father have history of heart disease.      Past Medical History:   Diagnosis Date    Abnormal Pap smear of cervix     Acid reflux     Ankle pain, left     Asthma     USES INHALERS    Bipolar disorder     Depression     Elevated cholesterol     H/O Enlarged tonsils     HPV (human papilloma virus) infection     Hypertension     Hypoglycemia     Lupus     Migraine     Ovarian cyst     PTSD (post-traumatic stress disorder)     no issues waking from anesthesia, would like to be told before she is touch    Recurrent streptococcal tonsillitis          Past Surgical History:   Procedure Laterality Date    ACHILLES TENDON SURGERY Left 01/16/2024    Procedure: LEFT ACHILLES TENDON LENGTHENING, LEFT GASTROCNEMIUS LENGTHENING;  Surgeon: dAriel Fitzgerald DPM;  Location: [facility] MAIN OR;  Service: Podiatry;  Laterality: Left;    ANKLE SURGERY Left 06/2021    HYSTERECTOMY  2018    abnormal pap smears, dysplasia[place]    LEG SURGERY Left     plate, juan, bolts and screws from knee to ankle    TONSILLECTOMY AND ADENOIDECTOMY Bilateral 05/26/2023    Procedure: TONSILLECTOMY AND NASOPHARYNGOSCOPY;  Surgeon: Wilner Montero MD;  Location: [facility] MAIN OR;  Service: ENT;  Laterality: Bilateral;           Social History     Socioeconomic History    Marital status: Single    Number of children: 3   Tobacco Use    Smoking status: Former     Current packs/day: 0.00     Types: Cigarettes     Quit date:      Years since quittin.0     Passive exposure: Never    Smokeless tobacco: Never   Vaping Use    Vaping status: Never Used   Substance and Sexual Activity    Alcohol use: Yes     Comment: everyday    Drug use: Not Currently     Types: Heroin     Comment: delta 8 gummies    Sexual activity: Yes     Partners: Male     Birth control/protection: Hysterectomy         Family History   Problem Relation Age of Onset    Breast cancer Maternal Grandmother 34    Ovarian cancer Maternal Great-Grandmother 38    Uterine cancer Neg Hx     Cervical cancer Neg Hx     Colon cancer Neg Hx     Stomach cancer Neg Hx     Skin cancer Neg Hx           Allergies   Allergen Reactions    Penicillins Anaphylaxis    Sulfa Antibiotics Anaphylaxis    Sulfasalazine Provider Review Needed            Current Outpatient Medications:     albuterol sulfate  (90 Base) MCG/ACT inhaler, Every 6 (Six) Hours., Disp: , Rfl:     Albuterol Sulfate, sensor, 108 (90 Base) MCG/ACT aerosol powder , , Disp: , Rfl:     ALPRAZolam (XANAX) 0.5 MG tablet, Take 1 tablet by mouth 3 (Three) Times a Day As Needed. for anxiety, Disp: , Rfl:     amitriptyline (ELAVIL) 10 MG tablet, Take 1 tablet by mouth every night at bedtime., Disp: , Rfl:     atorvastatin (LIPITOR) 10 MG tablet, Take 1 tablet by mouth Every Night., Disp: , Rfl:     benzonatate (TESSALON) 100 MG capsule, Take 1 capsule by mouth 3 (Three) Times a Day As Needed for Cough., Disp: , Rfl:     buPROPion XL (WELLBUTRIN XL) 150 MG 24 hr tablet, Take 1 tablet by mouth Every Morning., Disp: , Rfl:     cetirizine (zyrTEC) 10 MG tablet, Take 1 tablet by mouth Daily., Disp: , Rfl:     cholecalciferol (VITAMIN D3) 250 MCG (76907 UT) capsule, Take 1 capsule by mouth 1 (One) Time Per Week., Disp: , Rfl:      desvenlafaxine (PRISTIQ) 100 MG 24 hr tablet, Take 1 tablet by mouth Daily., Disp: , Rfl:     desvenlafaxine (PRISTIQ) 50 MG 24 hr tablet, Take 1 tablet by mouth Daily., Disp: , Rfl:     Desvenlafaxine Succinate ER 25 MG tablet sustained-release 24 hour, Take 1 tablet by mouth Daily., Disp: , Rfl:     lamoTRIgine (LaMICtal) 25 MG tablet, 10 mg., Disp: , Rfl:     levocetirizine (XYZAL) 5 MG tablet, Take 1 tablet by mouth Every Evening., Disp: , Rfl:     lisinopril (PRINIVIL,ZESTRIL) 10 MG tablet, Take 1 tablet by mouth Daily., Disp: , Rfl:     metroNIDAZOLE (METROGEL) 0.75 % gel, APPLY 1 GRAM TOPICALLY TO FACE TWICE DAILY, Disp: , Rfl:     naproxen (NAPROSYN) 500 MG tablet, Take 1 tablet by mouth Every 12 (Twelve) Hours As Needed. for pain, Disp: , Rfl:     OLANZapine (zyPREXA) 15 MG tablet, Take 1 tablet by mouth every night at bedtime., Disp: , Rfl:     omeprazole (priLOSEC) 40 MG capsule, Take 1 capsule by mouth Daily., Disp: , Rfl:     ondansetron ODT (ZOFRAN-ODT) 4 MG disintegrating tablet, , Disp: , Rfl:     prazosin (MINIPRESS) 2 MG capsule, Take 1 capsule by mouth Every Night., Disp: , Rfl:     pregabalin (LYRICA) 150 MG capsule, Take 1 capsule by mouth Daily., Disp: , Rfl:     Symbicort 80-4.5 MCG/ACT inhaler, INHALE 2 PUFFS BY MOUTH TWICE DAILY AS DIRECTED, Disp: , Rfl:     triamcinolone (KENALOG) 0.1 % cream, Apply 1 Application topically to the appropriate area as directed 3 (Three) Times a Day., Disp: 15 g, Rfl: 0    vitamin D (ERGOCALCIFEROL) 1.25 MG (58154 UT) capsule capsule, Take 1 capsule by mouth 1 (One) Time Per Week., Disp: , Rfl:     ziprasidone (GEODON) 80 MG capsule, Take 1 capsule by mouth 2 (Two) Times a Day With Meals., Disp: , Rfl:     atorvastatin (LIPITOR) 20 MG tablet, Take 1 tablet by mouth Every Night. (Patient not taking: Reported on 1/22/2025), Disp: , Rfl:     cephalexin (KEFLEX) 750 MG capsule, Take 1 capsule by mouth Every 12 (Twelve) Hours. (Patient not taking: Reported on  "1/22/2025), Disp: , Rfl:     desvenlafaxine (PRISTIQ) 100 MG 24 hr tablet, Take 2 tablets by mouth Daily. (Patient not taking: Reported on 1/22/2025), Disp: , Rfl:     Methotrexate (XATMEP PO), 2.5 mg daily (Patient not taking: Reported on 8/20/2024), Disp: , Rfl:     montelukast (SINGULAIR) 10 MG tablet, Take 1 tablet by mouth Every Evening. (Patient not taking: Reported on 1/22/2025), Disp: , Rfl:     OLANZapine (zyPREXA) 10 MG tablet, Take 1 tablet by mouth Every Night. (Patient not taking: Reported on 1/22/2025), Disp: , Rfl:     prazosin (MINIPRESS) 2 MG capsule, Take 1 capsule by mouth Every Night. (Patient not taking: Reported on 1/22/2025), Disp: , Rfl:     pregabalin (LYRICA) 100 MG capsule, TAKE 1 CAPSULE BY MOUTH TWICE DAILY AS DIRECTED (Patient not taking: Reported on 1/22/2025), Disp: , Rfl:     promethazine (PHENERGAN) 25 MG tablet, Take 1 tablet by mouth Every 6 (Six) Hours As Needed for Nausea or Vomiting. (Patient not taking: Reported on 1/22/2025), Disp: 20 tablet, Rfl: 0    Robitussin Cough+Chest Neftaly DM  MG/20ML liquid, Take 10 mL by mouth. (Patient not taking: Reported on 1/22/2025), Disp: , Rfl:       ROS:  Cardiac review of systems positive for atypical chest pain    Objective     /95   Pulse 94   Ht 172.7 cm (68\")   Wt (!) 145 kg (319 lb 3.2 oz)   BMI 48.53 kg/m²       General Appearance:   well developed  well nourished  HENT:   oropharynx moist  lips not cyanotic  Respiratory:  no respiratory distress  normal breath sounds  no rales  Cardiovascular:  no jugular venous distention  regular rhythm  S1 normal, S2 normal  no S3, no S4   no murmur  no rub, no thrill  No carotid bruit  pedal pulses normal  lower extremity edema: none    Musculoskeletal:  no clubbing of fingers.   normocephalic, head atraumatic  Skin:   warm, dry  Psychiatric:  judgement and insight appropriate  normal mood and affect    ECHO:    STRESS:    CATH:  No results found for this or any previous " "visit.    BMP:     Glucose   Date Value Ref Range Status   12/19/2024 124 (H) 65 - 99 mg/dL Final     BUN   Date Value Ref Range Status   12/19/2024 9 6 - 20 mg/dL Final     Creatinine   Date Value Ref Range Status   12/19/2024 0.91 0.57 - 1.00 mg/dL Final     Sodium   Date Value Ref Range Status   12/19/2024 136 136 - 145 mmol/L Final     Potassium   Date Value Ref Range Status   12/19/2024 4.2 3.5 - 5.2 mmol/L Final     Comment:     Slight hemolysis detected by analyzer. Result may be falsely elevated.     Chloride   Date Value Ref Range Status   12/19/2024 100 98 - 107 mmol/L Final     CO2   Date Value Ref Range Status   12/19/2024 21.3 (L) 22.0 - 29.0 mmol/L Final     Calcium   Date Value Ref Range Status   12/19/2024 9.9 8.6 - 10.5 mg/dL Final     BUN/Creatinine Ratio   Date Value Ref Range Status   12/19/2024 9.9 7.0 - 25.0 Final     Anion Gap   Date Value Ref Range Status   12/19/2024 14.7 5.0 - 15.0 mmol/L Final     eGFR   Date Value Ref Range Status   12/19/2024 83.5 >60.0 mL/min/1.73 Final     LIPIDS:  No results found for: \"CHOL\", \"TRIG\", \"HDL\", \"LDL\", \"VLDL\", \"LDLHDL\"      Procedures             ASSESSMENT:  Diagnoses and all orders for this visit:    1. Chest pain, unspecified type (Primary)    2. Primary hypertension    3. Mixed hyperlipidemia         PLAN:    1.  Blood pressure borderline but the last couple times have been under good control.  If needed they could go up on her lisinopril.  2.  Continue the Lipitor.  3.  Patient's chest pain is very atypical and I do not think it represents a cardiac source.  It occurs mainly when she is sitting and lying down and does not notice a significant exertional component to it.  Not sure if this is muscle or nerve irritation.  She is being worked up for possible breast reduction surgery which certainly could help with this pain.  I do think it is likely musculoskeletal in nature.  I told her I would consider taking Motrin a couple times a day for a week to " see if that resolves the pain but I do not think a stress test is warranted.  4.  Patient does not smoke.  5.  Reviewed EKG from 12/19/2024 which showed normal sinus rhythm with no abnormalities.      Return if symptoms worsen or fail to improve.     Patient was given instructions and counseling regarding her condition or for health maintenance advice. Please see specific information pulled into the AVS if appropriate.         Adriel Landry MD   1/22/2025  14:38 EST

## 2025-03-25 ENCOUNTER — TELEPHONE (OUTPATIENT)
Dept: PULMONOLOGY | Facility: CLINIC | Age: 40
End: 2025-03-25

## 2025-03-25 NOTE — TELEPHONE ENCOUNTER
Name: Yandel Dean    Relationship: Self    Best Callback Number:   Telephone Information:   Mobile [phone]     Patient would like to Reschedule their appointment. Unable to schedule within the 3 week timeframe.     Patient is having symptoms of NOTHING  Please call patient     Patient will be discharging on 6/17/2022 and needs to schedule a follow up with Christina in 1 week. Please call patient to schedule.

## 2025-05-18 ENCOUNTER — APPOINTMENT (OUTPATIENT)
Dept: GENERAL RADIOLOGY | Facility: HOSPITAL | Age: 40
End: 2025-05-18
Payer: COMMERCIAL

## 2025-05-18 ENCOUNTER — HOSPITAL ENCOUNTER (EMERGENCY)
Facility: HOSPITAL | Age: 40
Discharge: HOME | End: 2025-05-18
Attending: EMERGENCY MEDICINE | Admitting: EMERGENCY MEDICINE
Payer: COMMERCIAL

## 2025-05-18 VITALS
RESPIRATION RATE: 20 BRPM | TEMPERATURE: 98.6 F | SYSTOLIC BLOOD PRESSURE: 106 MMHG | DIASTOLIC BLOOD PRESSURE: 79 MMHG | HEART RATE: 100 BPM | OXYGEN SATURATION: 92 % | BODY MASS INDEX: 44.41 KG/M2 | HEIGHT: 68 IN | WEIGHT: 293 LBS

## 2025-05-18 DIAGNOSIS — J45.21 MILD INTERMITTENT ASTHMA WITH EXACERBATION: Primary | ICD-10-CM

## 2025-05-18 DIAGNOSIS — F41.9 ANXIETY: ICD-10-CM

## 2025-05-18 LAB
ALBUMIN SERPL-MCNC: 4.6 G/DL (ref 3.5–5.2)
ALBUMIN/GLOB SERPL: 1.8 G/DL
ALP SERPL-CCNC: 110 U/L (ref 39–117)
ALT SERPL W P-5'-P-CCNC: 88 U/L (ref 1–33)
ANION GAP SERPL CALCULATED.3IONS-SCNC: 18 MMOL/L (ref 5–15)
AST SERPL-CCNC: 74 U/L (ref 1–32)
BASOPHILS # BLD AUTO: 0.01 10*3/MM3 (ref 0–0.2)
BASOPHILS NFR BLD AUTO: 0.1 % (ref 0–1.5)
BILIRUB SERPL-MCNC: 0.9 MG/DL (ref 0–1.2)
BUN SERPL-MCNC: 10 MG/DL (ref 6–20)
BUN/CREAT SERPL: 9.2 (ref 7–25)
CALCIUM SPEC-SCNC: 9.9 MG/DL (ref 8.6–10.5)
CHLORIDE SERPL-SCNC: 99 MMOL/L (ref 98–107)
CO2 SERPL-SCNC: 18 MMOL/L (ref 22–29)
CREAT SERPL-MCNC: 1.09 MG/DL (ref 0.57–1)
D DIMER PPP FEU-MCNC: <0.27 MCGFEU/ML (ref 0–0.5)
DEPRECATED RDW RBC AUTO: 42.2 FL (ref 37–54)
EGFRCR SERPLBLD CKD-EPI 2021: 67.2 ML/MIN/1.73
EOSINOPHIL # BLD AUTO: 0 10*3/MM3 (ref 0–0.4)
EOSINOPHIL NFR BLD AUTO: 0 % (ref 0.3–6.2)
ERYTHROCYTE [DISTWIDTH] IN BLOOD BY AUTOMATED COUNT: 13 % (ref 12.3–15.4)
GLOBULIN UR ELPH-MCNC: 2.6 GM/DL
GLUCOSE SERPL-MCNC: 124 MG/DL (ref 65–99)
HCT VFR BLD AUTO: 38.1 % (ref 34–46.6)
HGB BLD-MCNC: 13.4 G/DL (ref 12–15.9)
HOLD SPECIMEN: NORMAL
IMM GRANULOCYTES # BLD AUTO: 0.02 10*3/MM3 (ref 0–0.05)
IMM GRANULOCYTES NFR BLD AUTO: 0.2 % (ref 0–0.5)
LIPASE SERPL-CCNC: 38 U/L (ref 13–60)
LYMPHOCYTES # BLD AUTO: 2.35 10*3/MM3 (ref 0.7–3.1)
LYMPHOCYTES NFR BLD AUTO: 23 % (ref 19.6–45.3)
MAGNESIUM SERPL-MCNC: 1.7 MG/DL (ref 1.6–2.6)
MCH RBC QN AUTO: 31.5 PG (ref 26.6–33)
MCHC RBC AUTO-ENTMCNC: 35.2 G/DL (ref 31.5–35.7)
MCV RBC AUTO: 89.6 FL (ref 79–97)
MONOCYTES # BLD AUTO: 0.49 10*3/MM3 (ref 0.1–0.9)
MONOCYTES NFR BLD AUTO: 4.8 % (ref 5–12)
NEUTROPHILS NFR BLD AUTO: 7.34 10*3/MM3 (ref 1.7–7)
NEUTROPHILS NFR BLD AUTO: 71.9 % (ref 42.7–76)
NRBC BLD AUTO-RTO: 0 /100 WBC (ref 0–0.2)
NT-PROBNP SERPL-MCNC: <36 PG/ML (ref 0–450)
PLATELET # BLD AUTO: 258 10*3/MM3 (ref 140–450)
PMV BLD AUTO: 11 FL (ref 6–12)
POTASSIUM SERPL-SCNC: 3.4 MMOL/L (ref 3.5–5.2)
PROT SERPL-MCNC: 7.2 G/DL (ref 6–8.5)
QT INTERVAL: 364 MS
QTC INTERVAL: 470 MS
RBC # BLD AUTO: 4.25 10*6/MM3 (ref 3.77–5.28)
SODIUM SERPL-SCNC: 135 MMOL/L (ref 136–145)
TROPONIN T SERPL HS-MCNC: 12 NG/L
WBC NRBC COR # BLD AUTO: 10.21 10*3/MM3 (ref 3.4–10.8)
WHOLE BLOOD HOLD COAG: NORMAL
WHOLE BLOOD HOLD SPECIMEN: NORMAL

## 2025-05-18 PROCEDURE — 80053 COMPREHEN METABOLIC PANEL: CPT | Performed by: EMERGENCY MEDICINE

## 2025-05-18 PROCEDURE — 93005 ELECTROCARDIOGRAM TRACING: CPT

## 2025-05-18 PROCEDURE — 99284 EMERGENCY DEPT VISIT MOD MDM: CPT

## 2025-05-18 PROCEDURE — 94799 UNLISTED PULMONARY SVC/PX: CPT

## 2025-05-18 PROCEDURE — 71045 X-RAY EXAM CHEST 1 VIEW: CPT

## 2025-05-18 PROCEDURE — 85379 FIBRIN DEGRADATION QUANT: CPT

## 2025-05-18 PROCEDURE — 85025 COMPLETE CBC W/AUTO DIFF WBC: CPT

## 2025-05-18 PROCEDURE — 93005 ELECTROCARDIOGRAM TRACING: CPT | Performed by: EMERGENCY MEDICINE

## 2025-05-18 PROCEDURE — 84484 ASSAY OF TROPONIN QUANT: CPT | Performed by: EMERGENCY MEDICINE

## 2025-05-18 PROCEDURE — 83880 ASSAY OF NATRIURETIC PEPTIDE: CPT | Performed by: EMERGENCY MEDICINE

## 2025-05-18 PROCEDURE — 83690 ASSAY OF LIPASE: CPT | Performed by: EMERGENCY MEDICINE

## 2025-05-18 PROCEDURE — 96374 THER/PROPH/DIAG INJ IV PUSH: CPT

## 2025-05-18 PROCEDURE — 83735 ASSAY OF MAGNESIUM: CPT | Performed by: EMERGENCY MEDICINE

## 2025-05-18 PROCEDURE — 25010000002 METHYLPREDNISOLONE PER 125 MG

## 2025-05-18 RX ORDER — ASPIRIN 81 MG/1
324 TABLET, CHEWABLE ORAL ONCE
Status: DISCONTINUED | OUTPATIENT
Start: 2025-05-18 | End: 2025-05-18

## 2025-05-18 RX ORDER — PREDNISONE 20 MG/1
40 TABLET ORAL DAILY
Qty: 10 TABLET | Refills: 0 | Status: SHIPPED | OUTPATIENT
Start: 2025-05-18 | End: 2025-05-23

## 2025-05-18 RX ORDER — IPRATROPIUM BROMIDE AND ALBUTEROL SULFATE 2.5; .5 MG/3ML; MG/3ML
3 SOLUTION RESPIRATORY (INHALATION) ONCE
Status: COMPLETED | OUTPATIENT
Start: 2025-05-18 | End: 2025-05-18

## 2025-05-18 RX ORDER — SODIUM CHLORIDE 0.9 % (FLUSH) 0.9 %
10 SYRINGE (ML) INJECTION AS NEEDED
Status: DISCONTINUED | OUTPATIENT
Start: 2025-05-18 | End: 2025-05-18 | Stop reason: HOSPADM

## 2025-05-18 RX ADMIN — METHYLPREDNISOLONE SODIUM SUCCINATE 125 MG: 125 INJECTION INTRAMUSCULAR; INTRAVENOUS at 20:44

## 2025-05-18 RX ADMIN — IPRATROPIUM BROMIDE AND ALBUTEROL SULFATE 3 ML: .5; 3 SOLUTION RESPIRATORY (INHALATION) at 19:32

## 2025-05-18 NOTE — ED PROVIDER NOTES
Time: 7:19 PM EDT  Date of encounter:  5/18/2025  Independent Historian/Clinical History and Information was obtained by:   Patient    History is limited by: N/A    Chief Complaint: Chest pain      History of Present Illness:  Patient is a 39 y.o. year old female who presents to the emergency department for evaluation of intermittent chest pain x 3 days.  Patient has a prior medical history significant for lupus, diabetes mellitus, asthma currently on ICS LABA therapy.    Patient Care Team  Primary Care Provider: Chio Magdaleno APRN    Past Medical History:     Allergies   Allergen Reactions    Penicillins Anaphylaxis    Sulfa Antibiotics Anaphylaxis    Sulfasalazine Provider Review Needed     Past Medical History:   Diagnosis Date    Abnormal Pap smear of cervix     Acid reflux     Ankle pain, left     Asthma     USES INHALERS    Bipolar disorder     Depression     Elevated cholesterol     H/O Enlarged tonsils     HPV (human papilloma virus) infection     Hypertension     Hypoglycemia     Lupus     Migraine     Ovarian cyst     PTSD (post-traumatic stress disorder)     no issues waking from anesthesia, would like to be told before she is touch    Recurrent streptococcal tonsillitis      Past Surgical History:   Procedure Laterality Date    ACHILLES TENDON SURGERY Left 01/16/2024    Procedure: LEFT ACHILLES TENDON LENGTHENING, LEFT GASTROCNEMIUS LENGTHENING;  Surgeon: Adriel Fitzgerald DPM;  Location: [facility] OR;  Service: Podiatry;  Laterality: Left;    ANKLE SURGERY Left 06/2021    HYSTERECTOMY  2018    abnormal pap smears, dysplasia[place]    LEG SURGERY Left     plate, juan, bolts and screws from knee to ankle    TONSILLECTOMY AND ADENOIDECTOMY Bilateral 05/26/2023    Procedure: TONSILLECTOMY AND NASOPHARYNGOSCOPY;  Surgeon: Wilner Montero MD;  Location: [facility] OR;  Service: ENT;  Laterality: Bilateral;     Family History   Problem Relation Age of Onset    Breast cancer Maternal Grandmother  34    Ovarian cancer Maternal Great-Grandmother 38    Uterine cancer Neg Hx     Cervical cancer Neg Hx     Colon cancer Neg Hx     Stomach cancer Neg Hx     Skin cancer Neg Hx        Home Medications:  Prior to Admission medications    Medication Sig Start Date End Date Taking? Authorizing Provider   albuterol sulfate  (90 Base) MCG/ACT inhaler Every 6 (Six) Hours. 12/14/22   Etienne Lamar MD   Albuterol Sulfate, sensor, 108 (90 Base) MCG/ACT aerosol powder      Etienne Lamar MD   ALPRAZolam (XANAX) 0.5 MG tablet Take 1 tablet by mouth 3 (Three) Times a Day As Needed. for anxiety 11/8/23   Etienne Lamar MD   amitriptyline (ELAVIL) 10 MG tablet Take 1 tablet by mouth every night at bedtime.    Etienne Lamar MD   atorvastatin (LIPITOR) 10 MG tablet Take 1 tablet by mouth Every Night. 1/25/23   Etienne Lamar MD   atorvastatin (LIPITOR) 20 MG tablet Take 1 tablet by mouth Every Night.  Patient not taking: Reported on 1/22/2025 12/12/24   Etienne Lamar MD   benzonatate (TESSALON) 100 MG capsule Take 1 capsule by mouth 3 (Three) Times a Day As Needed for Cough. 8/2/24   Etienne Lamar MD   buPROPion XL (WELLBUTRIN XL) 150 MG 24 hr tablet Take 1 tablet by mouth Every Morning. 12/11/24   Etienne Lamar MD   cephalexin (KEFLEX) 750 MG capsule Take 1 capsule by mouth Every 12 (Twelve) Hours.  Patient not taking: Reported on 1/22/2025 11/14/24   Etienne Lamar MD   cetirizine (zyrTEC) 10 MG tablet Take 1 tablet by mouth Daily.    Etienne Lamar MD   cholecalciferol (VITAMIN D3) 250 MCG (19257 UT) capsule Take 1 capsule by mouth 1 (One) Time Per Week. 11/2/24   Etienne Lamar MD   desvenlafaxine (PRISTIQ) 100 MG 24 hr tablet Take 1 tablet by mouth Daily.    Etienne Lamar MD   desvenlafaxine (PRISTIQ) 100 MG 24 hr tablet Take 2 tablets by mouth Daily.  Patient not taking: Reported on 1/22/2025    Etienne Lamar MD    desvenlafaxine (PRISTIQ) 50 MG 24 hr tablet Take 1 tablet by mouth Daily. 7/16/24   Etienne Lamar MD   Desvenlafaxine Succinate ER 25 MG tablet sustained-release 24 hour Take 1 tablet by mouth Daily.    Etienne Lamar MD   lamoTRIgine (LaMICtal) 25 MG tablet 10 mg. 4/1/24   Etienne Lamar MD   levocetirizine (XYZAL) 5 MG tablet Take 1 tablet by mouth Every Evening.    Etienne Lamar MD   lisinopril (PRINIVIL,ZESTRIL) 10 MG tablet Take 1 tablet by mouth Daily. 7/12/24   Etienne Lamar MD   Methotrexate (XATMEP PO) 2.5 mg daily  Patient not taking: Reported on 8/20/2024    Etienne Lamar MD   metroNIDAZOLE (METROGEL) 0.75 % gel APPLY 1 GRAM TOPICALLY TO FACE TWICE DAILY 2/26/24   Etienne Lamar MD   montelukast (SINGULAIR) 10 MG tablet Take 1 tablet by mouth Every Evening.  Patient not taking: Reported on 11/19/2024    Etienne Lamar MD   naproxen (NAPROSYN) 500 MG tablet Take 1 tablet by mouth Every 12 (Twelve) Hours As Needed. for pain 12/10/24   Etienne Lamar MD   OLANZapine (zyPREXA) 10 MG tablet Take 1 tablet by mouth Every Night.  Patient not taking: Reported on 11/19/2024    Etienne Lamar MD   OLANZapine (zyPREXA) 15 MG tablet Take 1 tablet by mouth every night at bedtime.    Etienne Lamar MD   omeprazole (priLOSEC) 40 MG capsule Take 1 capsule by mouth Daily.    Etienne Lamar MD   ondansetron ODT (ZOFRAN-ODT) 4 MG disintegrating tablet     Etienne Lamar MD   prazosin (MINIPRESS) 2 MG capsule Take 1 capsule by mouth Every Night.  Patient not taking: Reported on 11/19/2024 11/13/24   Etienne Lamar MD   prazosin (MINIPRESS) 2 MG capsule Take 1 capsule by mouth Every Night.    Etienne Lamar MD   pregabalin (LYRICA) 100 MG capsule TAKE 1 CAPSULE BY MOUTH TWICE DAILY AS DIRECTED  Patient not taking: Reported on 1/22/2025    Etienne Lamar MD   pregabalin (LYRICA) 150 MG capsule Take 1 capsule by  "mouth Daily. 24   Etienne Lamar MD   promethazine (PHENERGAN) 25 MG tablet Take 1 tablet by mouth Every 6 (Six) Hours As Needed for Nausea or Vomiting.  Patient not taking: Reported on 2024   Adriel Fitzgerald, DPM   Robitussin Cough+Chest Neftaly DM  MG/20ML liquid Take 10 mL by mouth.  Patient not taking: Reported on 2024   Etienne Lamar MD   Symbicort 80-4.5 MCG/ACT inhaler INHALE 2 PUFFS BY MOUTH TWICE DAILY AS DIRECTED 3/7/24   Etienne Lamar MD   triamcinolone (KENALOG) 0.1 % cream Apply 1 Application topically to the appropriate area as directed 3 (Three) Times a Day. 25   Karlee Apple APRN   vitamin D (ERGOCALCIFEROL) 1.25 MG (10848 UT) capsule capsule Take 1 capsule by mouth 1 (One) Time Per Week. 24   Etienne Lamar MD   ziprasidone (GEODON) 80 MG capsule Take 1 capsule by mouth 2 (Two) Times a Day With Meals. 23   Etienne Lamar MD        Social History:   Social History     Tobacco Use    Smoking status: Former     Current packs/day: 0.00     Types: Cigarettes     Quit date:      Years since quittin.3     Passive exposure: Never    Smokeless tobacco: Never   Vaping Use    Vaping status: Never Used   Substance Use Topics    Alcohol use: Yes     Comment: everyday    Drug use: Not Currently     Types: Heroin     Comment: delta 8 gummies         Review of Systems:  Review of Systems     Physical Exam:  /79 (BP Location: Right arm, Patient Position: Sitting)   Pulse 100   Temp 98.6 °F (37 °C) (Oral)   Resp 20   Ht 172.7 cm (68\")   Wt (!) 148 kg (326 lb 8 oz)   SpO2 92%   BMI 49.64 kg/m²     Physical Exam  Vitals and nursing note reviewed.   Constitutional:       General: She is not in acute distress.     Appearance: She is obese.   HENT:      Head: Normocephalic.      Mouth/Throat:      Mouth: Mucous membranes are moist.   Eyes:      Pupils: Pupils are equal, round, and reactive to light. "   Cardiovascular:      Rate and Rhythm: Tachycardia present.      Pulses:           Dorsalis pedis pulses are 2+ on the right side and 2+ on the left side.        Posterior tibial pulses are 2+ on the right side and 2+ on the left side.   Pulmonary:      Effort: Tachypnea present. No respiratory distress.      Breath sounds: Examination of the right-lower field reveals wheezing. Examination of the left-lower field reveals wheezing. Wheezing present. No rhonchi or rales.   Abdominal:      General: There is no distension.      Tenderness: There is no guarding.   Musculoskeletal:      Cervical back: Neck supple.      Right lower leg: No edema.      Left lower leg: No edema.   Skin:     General: Skin is warm and dry.   Neurological:      General: No focal deficit present.      Mental Status: She is alert and oriented to person, place, and time.   Psychiatric:         Mood and Affect: Mood is anxious.         Behavior: Behavior normal.      Comments: Affect congruent                    Medical Decision Making:      Comorbidities that affect care:    Asthma, Obesity    External Notes reviewed:    Previous Clinic Note: Previous clinic note on 5/2/2025 reviewed      The following orders were placed and all results were independently analyzed by me:  Orders Placed This Encounter   Procedures    XR Chest 1 View    [place] Draw    High Sensitivity Troponin T    Comprehensive Metabolic Panel    Lipase    BNP    Magnesium    CBC Auto Differential    D-dimer, Quantitative    Ambulatory Referral to Pulmonology    Undress & Gown    Continuous Pulse Oximetry    ECG 12 Lead ED Triage Standing Order; Chest Pain    CBC & Differential    Green Top (Gel)    Lavender Top    Gold Top - SST    Light Blue Top    Extra Tubes    Gray Top       Medications Given in the Emergency Department:  Medications   ipratropium-albuterol (DUO-NEB) nebulizer solution 3 mL (3 mL Nebulization Given 5/18/25 1932)   methylPREDNISolone sodium succinate  (SOLU-Medrol) 125 mg in sterile water (preservative free) 2 mL (125 mg Intravenous Given 5/18/25 2044)        ED Course:    ED Course as of 05/19/25 0126   Sun May 18, 2025   1915 EKG reviewed:  Patient is in a sinus tachycardia with a heart rate of 100 bpm.  Patient's intervals are within normal limit.  Patient's axis is within normal limits.  Patient exhibits no evidence of ST segment elevation or depression in contiguous leads or with reciprocal changes.  No evidence of peaked T waves or T wave inversion.  No evidence of acute heart ischemia [CB]   1926 CBC reviewed.  No acute findings. [CB]   1926 Initial troponin reviewed.  No acute findings. [CB]   1926 Lipase reviewed.  No acute findings.  No evidence of acute pancreatitis. [CB]   1927 proBNP reviewed.  No acute findings.  No evidence of lower extremity edema, anasarca, or evidence of hypervolemia.  No evidence of acute heart failure. [CB]   1927 Chemistry reviewed:  Patient exhibits signs of mild hypokalemia with a value of 3.4.  Patient's bicarb is decreased 18 anion gap of 18.  Glucose of 124.  No history of SGLT2 inhibitors.  No history of nausea or vomiting.  Abdominal pain.  Decreased bicarb likely secondary to hyperventilation.  No evidence of acute DKA or HHS. [CB]   1938 D-dimer reviewed.  No acute elevation.  No evidence of acute DVT /PE. [CB]      ED Course User Index  [CB] Eliecer Parrish, CELIA       Labs:    Lab Results (last 24 hours)       Procedure Component Value Units Date/Time    High Sensitivity Troponin T [[id number]]  (Normal) Collected: 05/18/25 1846    Specimen: Blood Updated: 05/18/25 1923     HS Troponin T 12 ng/L     Narrative:      High Sensitive Troponin T Reference Range:  <14.0 ng/L- Negative Female for AMI  <22.0 ng/L- Negative Male for AMI  >=14 - Abnormal Female indicating possible myocardial injury.  >=22 - Abnormal Male indicating possible myocardial injury.   Clinicians would have to utilize clinical acumen, EKG,  Troponin, and serial changes to determine if it is an Acute Myocardial Infarction or myocardial injury due to an underlying chronic condition.         CBC & Differential [[id number]]  (Abnormal) Collected: 05/18/25 1846    Specimen: Blood Updated: 05/18/25 1859    Narrative:      The following orders were created for panel order CBC & Differential.  Procedure                               Abnormality         Status                     ---------                               -----------         ------                     CBC Auto Differential[[id number]]        Abnormal            Final result                 Please view results for these tests on the individual orders.    Comprehensive Metabolic Panel [[id number]]  (Abnormal) Collected: 05/18/25 1846    Specimen: Blood Updated: 05/18/25 1923     Glucose 124 mg/dL      BUN 10 mg/dL      Creatinine 1.09 mg/dL      Sodium 135 mmol/L      Potassium 3.4 mmol/L      Chloride 99 mmol/L      CO2 18.0 mmol/L      Calcium 9.9 mg/dL      Total Protein 7.2 g/dL      Albumin 4.6 g/dL      ALT (SGPT) 88 U/L      AST (SGOT) 74 U/L      Alkaline Phosphatase 110 U/L      Total Bilirubin 0.9 mg/dL      Globulin 2.6 gm/dL      A/G Ratio 1.8 g/dL      BUN/Creatinine Ratio 9.2     Anion Gap 18.0 mmol/L      eGFR 67.2 mL/min/1.73     Narrative:      GFR Categories in Chronic Kidney Disease (CKD)              GFR Category          GFR (mL/min/1.73)    Interpretation  G1                    90 or greater        Normal or high (1)  G2                    60-89                Mild decrease (1)  G3a                   45-59                Mild to moderate decrease  G3b                   30-44                Moderate to severe decrease  G4                    15-29                Severe decrease  G5                    14 or less           Kidney failure    (1)In the absence of evidence of kidney disease, neither GFR category G1 or G2 fulfill the criteria for CKD.    eGFR calculation 2021 CKD-EPI  creatinine equation, which does not include race as a factor    Lipase [[id number]]  (Normal) Collected: 05/18/25 1846    Specimen: Blood Updated: 05/18/25 1923     Lipase 38 U/L     BNP [[id number]]  (Normal) Collected: 05/18/25 1846    Specimen: Blood Updated: 05/18/25 1919     proBNP <36.0 pg/mL     Narrative:      This assay is used as an aid in the diagnosis of individuals suspected of having heart failure. It can be used as an aid in the diagnosis of acute decompensated heart failure (ADHF) in patients presenting with signs and symptoms of ADHF to the emergency department (ED). In addition, NT-proBNP of <300 pg/mL indicates ADHF is not likely.    Age Range Result Interpretation  NT-proBNP Concentration (pg/mL:      <50             Positive            >450                   Gray                 300-450                    Negative             <300    50-75           Positive            >900                  Gray                300-900                  Negative            <300      >75             Positive            >1800                  Gray                300-1800                  Negative            <300    Magnesium [[id number]]  (Normal) Collected: 05/18/25 1846    Specimen: Blood Updated: 05/18/25 1923     Magnesium 1.7 mg/dL     CBC Auto Differential [[id number]]  (Abnormal) Collected: 05/18/25 1846    Specimen: Blood Updated: 05/18/25 1859     WBC 10.21 10*3/mm3      RBC 4.25 10*6/mm3      Hemoglobin 13.4 g/dL      Hematocrit 38.1 %      MCV 89.6 fL      MCH 31.5 pg      MCHC 35.2 g/dL      RDW 13.0 %      RDW-SD 42.2 fl      MPV 11.0 fL      Platelets 258 10*3/mm3      Neutrophil % 71.9 %      Lymphocyte % 23.0 %      Monocyte % 4.8 %      Eosinophil % 0.0 %      Basophil % 0.1 %      Immature Grans % 0.2 %      Neutrophils, Absolute 7.34 10*3/mm3      Lymphocytes, Absolute 2.35 10*3/mm3      Monocytes, Absolute 0.49 10*3/mm3      Eosinophils, Absolute 0.00 10*3/mm3      Basophils, Absolute 0.01 10*3/mm3   "    Immature Grans, Absolute 0.02 10*3/mm3      nRBC 0.0 /100 WBC     D-dimer, Quantitative [[id number]]  (Normal) Collected: 05/18/25 1846    Specimen: Blood Updated: 05/18/25 1931     D-Dimer, Quantitative <0.27 MCGFEU/mL     Narrative:      According to the assay 's published package insert, a normal (<0.50 MCGFEU/mL) D-dimer result in conjunction with a non-high clinical probability assessment, excludes deep vein thrombosis (DVT) and pulmonary embolism (PE) with high sensitivity.    D-dimer values increase with age and this can make VTE exclusion of an older population difficult. To address this, the [employer], based on best available evidence and recent guidelines, recommends that clinicians use age-adjusted D-dimer thresholds in patients greater than 50 years of age with: a) a low probability of PE who do not meet all Pulmonary Embolism Rule Out Criteria, or b) in those with intermediate probability of PE.   The formula for an age-adjusted D-dimer cut-off is \"age/100\".  For example, a 60 year old patient would have an age-adjusted cut-off of 0.60 MCGFEU/mL and an 80 year old 0.80 MCGFEU/mL.             Imaging:    XR Chest 1 View  Result Date: 5/18/2025  XR CHEST 1 VW Date of Exam: 5/18/2025 6:55 PM EDT Indication: Chest Pain Triage Protocol Comparison: Chest radiograph 8/29/2024 Findings: Mediastinum: Cardiac silhouette appears unchanged and normal in size Lungs: The lungs appear clear without focal consolidation appreciated. Pleura: No pleural effusion or pneumothorax. Bones and soft tissues: No acute, displaced fracture seen.     Impression: No radiographic evidence of acute cardiopulmonary abnormality. Electronically Signed: Jamison Moses MD  5/18/2025 7:12 PM EDT  Workstation ID: [id number]        Differential Diagnosis and Discussion:    Chest Pain:  Based on the patient's signs and symptoms, I considered aortic dissection, myocardial infaction, pulmonary embolism, " cardiac tamponade, pericarditis, pneumothorax, musculoskeletal chest pain and other differential diagnosis as an etiology of the patient's chest pain.   Dyspnea: Differential diagnosis includes but is not limited to metabolic acidosis, neurological disorders, psychogenic, asthma, pneumothorax, upper airway obstruction, COPD, pneumonia, noncardiogenic pulmonary edema, interstitial lung disease, anemia, congestive heart failure, and pulmonary embolism    PROCEDURES:    Labs were collected in the emergency department and all labs were reviewed and interpreted by me.  An EKG was performed and the EKG was interpreted by me.    ECG 12 Lead ED Triage Standing Order; Chest Pain   Preliminary Result   HEART LDVN=800  bpm   RR Cbbbubmy=074  ms   UT Zbrijhcy=998  ms   P Horizontal Axis=-5  deg   P Front Axis=43  deg   QRSD Bvihzpaa=675  ms   QT Gcylljla=685  ms   USqU=800  ms   QRS Axis=45  deg   T Wave Axis=16  deg   - OTHERWISE NORMAL ECG -   Sinus tachycardia   Date and Time of Study:2025-05-18 18:32:43          Procedures    MDM     Amount and/or Complexity of Data Reviewed  Clinical lab tests: reviewed  Tests in the radiology section of CPT®: reviewed  Tests in the medicine section of CPT®: reviewed  Decide to obtain previous medical records or to obtain history from someone other than the patient: yes    The patient presents with shortness of breath consistent with their asthma exacerbations. The patient was monitored in the ED for[]. The patient reports significant relief of their symptoms with ED treatment. The patient has no respiratory distress or hypoxia. This includes the absence of cervical, clavicular, and abdominal retractions; tachypnea and an oxygen saturation of less than 92% on room air.  Patient was able to complete an ambulatory O2 on room air with oxygen saturations remaining greater than 97% during the entirety of the ambulation trial.  The patient is able to speak in full sentences and is ambulatory  without hypoxia on exertion. The patient's condition is stable and appropriate for discharge. The patient will be discharged with a prescription for bronchodilators and a steroid. The patient was advised to return for fever, respiratory distress, intractable vomiting, weakness, worsening shortness of breath, chest pain, or altered mental status. The [patient]will pursue further outpatient evaluation with the primary care physician. The [patient]has expressed a clear and thorough understanding and agreed to follow-up as instructed.                  Patient Care Considerations:    SEPSIS IS NOT PRESENT IN THE EMERGENCY DEPARTMENT: Patient meets SIRS criteria in the emergency department however the patient does not have a known source of bacterial infection to confirm the diagnosis of sepsis.   CT CHEST: I considered ordering a CT scan of the chest, however D-dimer less than 0.27.      Consultants/Shared Management Plan:    SHARED VISIT: I have discussed the case with my supervising physician, Dr. Moore who states Crees treatment plan as outlined in MDM and ED course. The substantive portion of the medical decision was made by the attesting physician who made or approve the management plan and will take responsibility for the patient.  Clinical findings were discussed and ultimate disposition was made in consult with supervising physician.    Social Determinants of Health:    Patient is independent, reliable, and has access to care.       Disposition and Care Coordination:    Discharged: I considered escalation of care by admitting this patient to the hospital, however patient does not meet sepsis criteria    I have explained the patient´s condition, diagnoses and treatment plan based on the information available to me at this time. I have answered questions and addressed any concerns. The patient has a good  understanding of the patient´s diagnosis, condition, and treatment plan as can be expected at this point. The  vital signs have been stable. The patient´s condition is stable and appropriate for discharge from the emergency department.      The patient will pursue further outpatient evaluation with the primary care physician or other designated or consulting physician as outlined in the discharge instructions. They are agreeable to this plan of care and follow-up instructions have been explained in detail. The patient has received these instructions in written format and has expressed an understanding of the discharge instructions. The patient is aware that any significant change in condition or worsening of symptoms should prompt an immediate return to this or the closest emergency department or call to [facility].  I have explained discharge medications and the need for follow up with the patient/caretakers. This was also printed in the discharge instructions. Patient was discharged with the following medications and follow up:      Medication List        New Prescriptions      predniSONE 20 MG tablet  Commonly known as: DELTASONE  Take 2 tablets by mouth Daily for 5 days.               Where to Get Your Medications        These medications were sent to [id number] - [place], [place] - [address] AT [facility] [phone] [facility] [phone] FX  [address] [place] [address] [place] [address]      Phone: [phone]   predniSONE 20 MG tablet      Chio Magdaleno APRN  [address] [place] [address]  [phone]    Schedule an appointment as soon as possible for a visit       Babar Lauren MD  [address] [address]  [place] [address]  [phone]             Final diagnoses:   Mild intermittent asthma with exacerbation   Anxiety        ED Disposition       ED Disposition   Discharge    Condition   Stable    Comment   --               This medical record created using voice recognition software.             Eliecer Parrish PA-C  05/19/25 0126

## 2025-05-18 NOTE — ED PROVIDER NOTES
"SHARED VISIT ATTESTATION:    This visit was performed by myself and an APC.  I personally approved the management plan/medical decision making and take responsibility for the patient management.      SHARED VISIT NOTE:    Patient is 39 y.o. year old female that presents to the ED for evaluation of chest pain.     Physical Exam    ED Course:    /79 (BP Location: Right arm, Patient Position: Sitting)   Pulse 100   Temp 98.6 °F (37 °C) (Oral)   Resp 20   Ht 172.7 cm (68\")   Wt (!) 148 kg (326 lb 8 oz)   SpO2 92%   BMI 49.64 kg/m²       The following orders were placed and all results were independently analyzed by me:  Orders Placed This Encounter   Procedures    XR Chest 1 View    [place] Draw    High Sensitivity Troponin T    Comprehensive Metabolic Panel    Lipase    BNP    Magnesium    CBC Auto Differential    D-dimer, Quantitative    Ambulatory Referral to Pulmonology    NPO Diet NPO Type: Strict NPO    Undress & Gown    Continuous Pulse Oximetry    Oxygen Therapy- Nasal Cannula; Titrate 1-6 LPM Per SpO2; 90 - 95%    Pulse Oximetry While Ambulating    ECG 12 Lead ED Triage Standing Order; Chest Pain    ECG 12 Lead ED Triage Standing Order; Chest Pain    Insert Peripheral IV    CBC & Differential    Green Top (Gel)    Lavender Top    Gold Top - SST    Light Blue Top    Extra Tubes    Gray Top       Medications Given in the Emergency Department:  Medications   sodium chloride 0.9 % flush 10 mL (has no administration in time range)   ipratropium-albuterol (DUO-NEB) nebulizer solution 3 mL (3 mL Nebulization Given 5/18/25 1932)   methylPREDNISolone sodium succinate (SOLU-Medrol) 125 mg in sterile water (preservative free) 2 mL (125 mg Intravenous Given 5/18/25 2044)        ED Course:    ED Course as of 05/18/25 2156   Sun May 18, 2025   1915 EKG reviewed:  Patient is in a sinus tachycardia with a heart rate of 100 bpm.  Patient's intervals are within normal limit.  Patient's axis is within normal limits.  " Patient exhibits no evidence of ST segment elevation or depression in contiguous leads or with reciprocal changes.  No evidence of peaked T waves or T wave inversion.  No evidence of acute heart ischemia [CB]   1926 CBC reviewed.  No acute findings. [CB]   1926 Initial troponin reviewed.  No acute findings. [CB]   1926 Lipase reviewed.  No acute findings.  No evidence of acute pancreatitis. [CB]   1927 proBNP reviewed.  No acute findings.  No evidence of lower extremity edema, anasarca, or evidence of hypervolemia.  No evidence of acute heart failure. [CB]   1927 Chemistry reviewed:  Patient exhibits signs of mild hypokalemia with a value of 3.4.  Patient's bicarb is decreased 18 anion gap of 18.  Glucose of 124.  No history of SGLT2 inhibitors.  No history of nausea or vomiting.  Abdominal pain.  Decreased bicarb likely secondary to hyperventilation.  No evidence of acute DKA or HHS. [CB]   1938 D-dimer reviewed.  No acute elevation.  No evidence of acute DVT /PE. [CB]      ED Course User Index  [CB] Eliecer Parrish, CELIA       Labs:    Lab Results (last 24 hours)       Procedure Component Value Units Date/Time    High Sensitivity Troponin T [[id number]]  (Normal) Collected: 05/18/25 1846    Specimen: Blood Updated: 05/18/25 1923     HS Troponin T 12 ng/L     Narrative:      High Sensitive Troponin T Reference Range:  <14.0 ng/L- Negative Female for AMI  <22.0 ng/L- Negative Male for AMI  >=14 - Abnormal Female indicating possible myocardial injury.  >=22 - Abnormal Male indicating possible myocardial injury.   Clinicians would have to utilize clinical acumen, EKG, Troponin, and serial changes to determine if it is an Acute Myocardial Infarction or myocardial injury due to an underlying chronic condition.         CBC & Differential [[id number]]  (Abnormal) Collected: 05/18/25 1846    Specimen: Blood Updated: 05/18/25 1859    Narrative:      The following orders were created for panel order CBC &  Differential.  Procedure                               Abnormality         Status                     ---------                               -----------         ------                     CBC Auto Differential[[id number]]        Abnormal            Final result                 Please view results for these tests on the individual orders.    Comprehensive Metabolic Panel [[id number]]  (Abnormal) Collected: 05/18/25 1846    Specimen: Blood Updated: 05/18/25 1923     Glucose 124 mg/dL      BUN 10 mg/dL      Creatinine 1.09 mg/dL      Sodium 135 mmol/L      Potassium 3.4 mmol/L      Chloride 99 mmol/L      CO2 18.0 mmol/L      Calcium 9.9 mg/dL      Total Protein 7.2 g/dL      Albumin 4.6 g/dL      ALT (SGPT) 88 U/L      AST (SGOT) 74 U/L      Alkaline Phosphatase 110 U/L      Total Bilirubin 0.9 mg/dL      Globulin 2.6 gm/dL      A/G Ratio 1.8 g/dL      BUN/Creatinine Ratio 9.2     Anion Gap 18.0 mmol/L      eGFR 67.2 mL/min/1.73     Narrative:      GFR Categories in Chronic Kidney Disease (CKD)              GFR Category          GFR (mL/min/1.73)    Interpretation  G1                    90 or greater        Normal or high (1)  G2                    60-89                Mild decrease (1)  G3a                   45-59                Mild to moderate decrease  G3b                   30-44                Moderate to severe decrease  G4                    15-29                Severe decrease  G5                    14 or less           Kidney failure    (1)In the absence of evidence of kidney disease, neither GFR category G1 or G2 fulfill the criteria for CKD.    eGFR calculation 2021 CKD-EPI creatinine equation, which does not include race as a factor    Lipase [[id number]]  (Normal) Collected: 05/18/25 1846    Specimen: Blood Updated: 05/18/25 1923     Lipase 38 U/L     BNP [[id number]]  (Normal) Collected: 05/18/25 1846    Specimen: Blood Updated: 05/18/25 1919     proBNP <36.0 pg/mL     Narrative:      This assay is used as  an aid in the diagnosis of individuals suspected of having heart failure. It can be used as an aid in the diagnosis of acute decompensated heart failure (ADHF) in patients presenting with signs and symptoms of ADHF to the emergency department (ED). In addition, NT-proBNP of <300 pg/mL indicates ADHF is not likely.    Age Range Result Interpretation  NT-proBNP Concentration (pg/mL:      <50             Positive            >450                   Gray                 300-450                    Negative             <300    50-75           Positive            >900                  Gray                300-900                  Negative            <300      >75             Positive            >1800                  Gray                300-1800                  Negative            <300    Magnesium [[id number]]  (Normal) Collected: 05/18/25 1846    Specimen: Blood Updated: 05/18/25 1923     Magnesium 1.7 mg/dL     CBC Auto Differential [[id number]]  (Abnormal) Collected: 05/18/25 1846    Specimen: Blood Updated: 05/18/25 1859     WBC 10.21 10*3/mm3      RBC 4.25 10*6/mm3      Hemoglobin 13.4 g/dL      Hematocrit 38.1 %      MCV 89.6 fL      MCH 31.5 pg      MCHC 35.2 g/dL      RDW 13.0 %      RDW-SD 42.2 fl      MPV 11.0 fL      Platelets 258 10*3/mm3      Neutrophil % 71.9 %      Lymphocyte % 23.0 %      Monocyte % 4.8 %      Eosinophil % 0.0 %      Basophil % 0.1 %      Immature Grans % 0.2 %      Neutrophils, Absolute 7.34 10*3/mm3      Lymphocytes, Absolute 2.35 10*3/mm3      Monocytes, Absolute 0.49 10*3/mm3      Eosinophils, Absolute 0.00 10*3/mm3      Basophils, Absolute 0.01 10*3/mm3      Immature Grans, Absolute 0.02 10*3/mm3      nRBC 0.0 /100 WBC     D-dimer, Quantitative [[id number]]  (Normal) Collected: 05/18/25 1846    Specimen: Blood Updated: 05/18/25 1931     D-Dimer, Quantitative <0.27 MCGFEU/mL     Narrative:      According to the assay 's published package insert, a normal (<0.50 MCGFEU/mL)  "D-dimer result in conjunction with a non-high clinical probability assessment, excludes deep vein thrombosis (DVT) and pulmonary embolism (PE) with high sensitivity.    D-dimer values increase with age and this can make VTE exclusion of an older population difficult. To address this, the [employer], based on best available evidence and recent guidelines, recommends that clinicians use age-adjusted D-dimer thresholds in patients greater than 50 years of age with: a) a low probability of PE who do not meet all Pulmonary Embolism Rule Out Criteria, or b) in those with intermediate probability of PE.   The formula for an age-adjusted D-dimer cut-off is \"age/100\".  For example, a 60 year old patient would have an age-adjusted cut-off of 0.60 MCGFEU/mL and an 80 year old 0.80 MCGFEU/mL.             Imaging:    XR Chest 1 View  Result Date: 5/18/2025  XR CHEST 1 VW Date of Exam: 5/18/2025 6:55 PM EDT Indication: Chest Pain Triage Protocol Comparison: Chest radiograph 8/29/2024 Findings: Mediastinum: Cardiac silhouette appears unchanged and normal in size Lungs: The lungs appear clear without focal consolidation appreciated. Pleura: No pleural effusion or pneumothorax. Bones and soft tissues: No acute, displaced fracture seen.     Impression: No radiographic evidence of acute cardiopulmonary abnormality. Electronically Signed: Jamison Moses MD  5/18/2025 7:12 PM EDT  Workstation ID: [id number]      MDM:    Procedures    Labs were collected in the emergency department and all labs were reviewed and interpreted by me.  X-ray were performed in the emergency department and all X-ray impressions were independently interpreted by me.  An EKG was performed and the EKG was interpreted by me.                     Cyrus Moore MD  21:56 EDT  05/18/25         Cyrus Moore MD  05/18/25 2156    "

## 2025-05-19 NOTE — DISCHARGE INSTRUCTIONS
Thank you for allowing us to provide care for you today.  Your workup today revealed evidence of asthma exacerbation. You have been prescribed steroid medication I would like you to take beginning tomorrow morning for the next 5 days.  I recommend that you follow-up with your primary care provider in the next 7 days along with an ambulatory referral to see your pulmonologist as this asthma is not controlled at this time.  Thank you

## 2025-06-11 LAB
QT INTERVAL: 364 MS
QTC INTERVAL: 470 MS

## 2025-06-30 ENCOUNTER — HOSPITAL ENCOUNTER (EMERGENCY)
Facility: HOSPITAL | Age: 40
Discharge: HOME | End: 2025-06-30
Attending: EMERGENCY MEDICINE | Admitting: EMERGENCY MEDICINE
Payer: COMMERCIAL

## 2025-06-30 VITALS
TEMPERATURE: 98.3 F | WEIGHT: 293 LBS | HEIGHT: 68 IN | OXYGEN SATURATION: 98 % | DIASTOLIC BLOOD PRESSURE: 108 MMHG | RESPIRATION RATE: 18 BRPM | BODY MASS INDEX: 44.41 KG/M2 | SYSTOLIC BLOOD PRESSURE: 124 MMHG | HEART RATE: 95 BPM

## 2025-06-30 DIAGNOSIS — F32.A DEPRESSION WITH SUICIDAL IDEATION: ICD-10-CM

## 2025-06-30 DIAGNOSIS — R45.851 DEPRESSION WITH SUICIDAL IDEATION: ICD-10-CM

## 2025-06-30 DIAGNOSIS — Z00.8 MEDICAL CLEARANCE FOR PSYCHIATRIC ADMISSION: ICD-10-CM

## 2025-06-30 DIAGNOSIS — T45.0X2A INTENTIONAL DIPHENHYDRAMINE OVERDOSE, INITIAL ENCOUNTER: Primary | ICD-10-CM

## 2025-06-30 LAB
ALBUMIN SERPL-MCNC: 4.6 G/DL (ref 3.5–5.2)
ALBUMIN/GLOB SERPL: 1.5 G/DL
ALP SERPL-CCNC: 114 U/L (ref 39–117)
ALT SERPL W P-5'-P-CCNC: 60 U/L (ref 1–33)
AMPHET+METHAMPHET UR QL: NEGATIVE
AMPHETAMINES UR QL: NEGATIVE
ANION GAP SERPL CALCULATED.3IONS-SCNC: 13.5 MMOL/L (ref 5–15)
APAP SERPL-MCNC: <5 MCG/ML (ref 0–30)
AST SERPL-CCNC: 57 U/L (ref 1–32)
BARBITURATES UR QL SCN: NEGATIVE
BASOPHILS # BLD AUTO: 0 10*3/MM3 (ref 0–0.2)
BASOPHILS NFR BLD AUTO: 0 % (ref 0–1.5)
BENZODIAZ UR QL SCN: POSITIVE
BILIRUB SERPL-MCNC: 0.7 MG/DL (ref 0–1.2)
BUN SERPL-MCNC: 8.3 MG/DL (ref 6–20)
BUN/CREAT SERPL: 8.5 (ref 7–25)
BUPRENORPHINE SERPL-MCNC: NEGATIVE NG/ML
CALCIUM SPEC-SCNC: 9.5 MG/DL (ref 8.6–10.5)
CANNABINOIDS SERPL QL: POSITIVE
CHLORIDE SERPL-SCNC: 101 MMOL/L (ref 98–107)
CO2 SERPL-SCNC: 21.5 MMOL/L (ref 22–29)
COCAINE UR QL: NEGATIVE
CREAT SERPL-MCNC: 0.98 MG/DL (ref 0.57–1)
DEPRECATED RDW RBC AUTO: 46.6 FL (ref 37–54)
EGFRCR SERPLBLD CKD-EPI 2021: 76.4 ML/MIN/1.73
EOSINOPHIL # BLD AUTO: 0 10*3/MM3 (ref 0–0.4)
EOSINOPHIL NFR BLD AUTO: 0 % (ref 0.3–6.2)
ERYTHROCYTE [DISTWIDTH] IN BLOOD BY AUTOMATED COUNT: 13.9 % (ref 12.3–15.4)
ETHANOL BLD-MCNC: <10 MG/DL (ref 0–10)
ETHANOL UR QL: <0.01 %
FENTANYL UR-MCNC: NEGATIVE NG/ML
GLOBULIN UR ELPH-MCNC: 3.1 GM/DL
GLUCOSE SERPL-MCNC: 101 MG/DL (ref 65–99)
HCT VFR BLD AUTO: 38.1 % (ref 34–46.6)
HGB BLD-MCNC: 13.1 G/DL (ref 12–15.9)
HOLD SPECIMEN: NORMAL
HOLD SPECIMEN: NORMAL
IMM GRANULOCYTES # BLD AUTO: 0.02 10*3/MM3 (ref 0–0.05)
IMM GRANULOCYTES NFR BLD AUTO: 0.3 % (ref 0–0.5)
LYMPHOCYTES # BLD AUTO: 2.3 10*3/MM3 (ref 0.7–3.1)
LYMPHOCYTES NFR BLD AUTO: 28.8 % (ref 19.6–45.3)
MAGNESIUM SERPL-MCNC: 2.1 MG/DL (ref 1.6–2.6)
MCH RBC QN AUTO: 31.8 PG (ref 26.6–33)
MCHC RBC AUTO-ENTMCNC: 34.4 G/DL (ref 31.5–35.7)
MCV RBC AUTO: 92.5 FL (ref 79–97)
METHADONE UR QL SCN: NEGATIVE
MONOCYTES # BLD AUTO: 0.37 10*3/MM3 (ref 0.1–0.9)
MONOCYTES NFR BLD AUTO: 4.6 % (ref 5–12)
NEUTROPHILS NFR BLD AUTO: 5.3 10*3/MM3 (ref 1.7–7)
NEUTROPHILS NFR BLD AUTO: 66.3 % (ref 42.7–76)
NRBC BLD AUTO-RTO: 0 /100 WBC (ref 0–0.2)
OPIATES UR QL: NEGATIVE
OXYCODONE UR QL SCN: NEGATIVE
PCP UR QL SCN: NEGATIVE
PLATELET # BLD AUTO: 276 10*3/MM3 (ref 140–450)
PMV BLD AUTO: 10.6 FL (ref 6–12)
POTASSIUM SERPL-SCNC: 4 MMOL/L (ref 3.5–5.2)
PROT SERPL-MCNC: 7.7 G/DL (ref 6–8.5)
RBC # BLD AUTO: 4.12 10*6/MM3 (ref 3.77–5.28)
SALICYLATES SERPL-MCNC: <0.3 MG/DL
SODIUM SERPL-SCNC: 136 MMOL/L (ref 136–145)
TRICYCLICS UR QL SCN: POSITIVE
TSH SERPL DL<=0.05 MIU/L-ACNC: 2.14 UIU/ML (ref 0.27–4.2)
WBC NRBC COR # BLD AUTO: 7.99 10*3/MM3 (ref 3.4–10.8)
WHOLE BLOOD HOLD COAG: NORMAL
WHOLE BLOOD HOLD SPECIMEN: NORMAL

## 2025-06-30 PROCEDURE — 80179 DRUG ASSAY SALICYLATE: CPT | Performed by: EMERGENCY MEDICINE

## 2025-06-30 PROCEDURE — 36415 COLL VENOUS BLD VENIPUNCTURE: CPT

## 2025-06-30 PROCEDURE — 82077 ASSAY SPEC XCP UR&BREATH IA: CPT | Performed by: EMERGENCY MEDICINE

## 2025-06-30 PROCEDURE — 99285 EMERGENCY DEPT VISIT HI MDM: CPT

## 2025-06-30 PROCEDURE — 83735 ASSAY OF MAGNESIUM: CPT | Performed by: EMERGENCY MEDICINE

## 2025-06-30 PROCEDURE — 80143 DRUG ASSAY ACETAMINOPHEN: CPT | Performed by: EMERGENCY MEDICINE

## 2025-06-30 PROCEDURE — 85025 COMPLETE CBC W/AUTO DIFF WBC: CPT | Performed by: EMERGENCY MEDICINE

## 2025-06-30 PROCEDURE — 93005 ELECTROCARDIOGRAM TRACING: CPT

## 2025-06-30 PROCEDURE — 80053 COMPREHEN METABOLIC PANEL: CPT | Performed by: EMERGENCY MEDICINE

## 2025-06-30 PROCEDURE — 93005 ELECTROCARDIOGRAM TRACING: CPT | Performed by: EMERGENCY MEDICINE

## 2025-06-30 PROCEDURE — 84443 ASSAY THYROID STIM HORMONE: CPT | Performed by: EMERGENCY MEDICINE

## 2025-06-30 PROCEDURE — 80307 DRUG TEST PRSMV CHEM ANLYZR: CPT | Performed by: EMERGENCY MEDICINE

## 2025-06-30 RX ORDER — SODIUM CHLORIDE 0.9 % (FLUSH) 0.9 %
10 SYRINGE (ML) INJECTION AS NEEDED
Status: DISCONTINUED | OUTPATIENT
Start: 2025-06-30 | End: 2025-06-30 | Stop reason: HOSPADM

## 2025-06-30 NOTE — ED PROVIDER NOTES
Time: 2:23 PM EDT  Date of encounter:  6/30/2025  Independent Historian/Clinical History and Information was obtained by:   Patient and Nursing Staff    History is limited by: N/A    Chief Complaint: Overdose, needs medical clearance for [facility]      History of Present Illness:  Patient is a 39 y.o. year old female with history of bipolar disorder and PTSD and anxiety and depression who presents to the emergency department for evaluation of needs medical clearance per [facility] after she admits that she did take a Benadryl overdose and drink some alcohol last night around dinnertime at 6 PM.    She states she was trying to harm and possibly kill herself at that time.    Took possibly up to 15 Benadryl tablets at that time and drank some alcohol.    Her only complaint now is that she feels a little sleepy.    She is wide-awake and smiling and in no distress and no tachycardia or mumbling speech noted or signs of anticholinergic syndrome.  Denies any other medications or coingestants.      Patient Care Team  Primary Care Provider: Chio Magdaleno APRN    Past Medical History:     Allergies   Allergen Reactions    Penicillins Anaphylaxis    Sulfa Antibiotics Anaphylaxis    Sulfasalazine Unknown - High Severity     Past Medical History:   Diagnosis Date    Abnormal Pap smear of cervix     Acid reflux     Ankle pain, left     Asthma     USES INHALERS    Bipolar disorder     Depression     Elevated cholesterol     H/O Enlarged tonsils     HPV (human papilloma virus) infection     Hypertension     Hypoglycemia     Lupus     Migraine     Ovarian cyst     PTSD (post-traumatic stress disorder)     no issues waking from anesthesia, would like to be told before she is touch    Recurrent streptococcal tonsillitis      Past Surgical History:   Procedure Laterality Date    ACHILLES TENDON SURGERY Left 01/16/2024    Procedure: LEFT ACHILLES TENDON LENGTHENING, LEFT GASTROCNEMIUS  LENGTHENING;  Surgeon: Adriel Fitzgerald DPM;  Location: [facility] MAIN OR;  Service: Podiatry;  Laterality: Left;    ANKLE SURGERY Left 06/2021    HYSTERECTOMY  2018    abnormal pap smears, dysplasia[place] of state    LEG SURGERY Left     plate, juan, bolts and screws from knee to ankle    TONSILLECTOMY AND ADENOIDECTOMY Bilateral 05/26/2023    Procedure: TONSILLECTOMY AND NASOPHARYNGOSCOPY;  Surgeon: Wilner Montero MD;  Location: [facility] MAIN OR;  Service: ENT;  Laterality: Bilateral;     Family History   Problem Relation Age of Onset    Breast cancer Maternal Grandmother 34    Ovarian cancer Maternal Great-Grandmother 38    Uterine cancer Neg Hx     Cervical cancer Neg Hx     Colon cancer Neg Hx     Stomach cancer Neg Hx     Skin cancer Neg Hx        Home Medications:  Prior to Admission medications    Medication Sig Start Date End Date Taking? Authorizing Provider   albuterol (PROVENTIL) (2.5 MG/3ML) 0.083% nebulizer solution Take 2.5 mg by nebulization Every 6 (Six) Hours As Needed for Wheezing.    Etienne Lamar MD   albuterol sulfate  (90 Base) MCG/ACT inhaler Every 6 (Six) Hours. 12/14/22   Etienne Lamar MD   Albuterol Sulfate, sensor, 108 (90 Base) MCG/ACT aerosol powder      Etienne Lamar MD   ALPRAZolam (XANAX) 0.5 MG tablet Take 1 tablet by mouth 3 (Three) Times a Day As Needed. for anxiety 11/8/23   Etienne Lamar MD   amitriptyline (ELAVIL) 10 MG tablet Take 1 tablet by mouth every night at bedtime.    ProviderEtienne MD   atorvastatin (LIPITOR) 10 MG tablet Take 1 tablet by mouth Every Night. 1/25/23   Etienne Lamar MD   buPROPion XL (WELLBUTRIN XL) 150 MG 24 hr tablet Take 1 tablet by mouth Every Morning. 12/11/24   Etienne Lamar MD   cephalexin (KEFLEX) 500 MG capsule Take 1 capsule by mouth 2 (Two) Times a Day for 10 days. 6/23/25 7/3/25  Rodríguez Mcfarlane APRN   cetirizine (zyrTEC) 10 MG tablet Take 1 tablet by mouth Daily.    Brianda  MD Etienne   chlorhexidine (PERIDEX) 0.12 % solution  4/30/25   Etienne Lamar MD   cholecalciferol (VITAMIN D3) 250 MCG (68542 UT) capsule Take 1 capsule by mouth 1 (One) Time Per Week. 11/2/24   Etienne Lamar MD   ibuprofen (ADVIL,MOTRIN) 600 MG tablet Take 1 tablet by mouth Every 6 (Six) Hours As Needed. for pain    Etienne Lamar MD   lamoTRIgine (LaMICtal) 25 MG tablet 10 mg. 4/1/24   Etienne Lamar MD   levocetirizine (XYZAL) 5 MG tablet Take 1 tablet by mouth Every Evening.    Etienne Lamar MD   lisinopril (PRINIVIL,ZESTRIL) 10 MG tablet Take 1 tablet by mouth Daily. 7/12/24   Etienne Lamar MD   loratadine (CLARITIN) 10 MG tablet TAKE 1 TABLET BY MOUTH DAILY FOR ALLERGIC RHINITIS 6/13/25   Etienne Lamar MD   naproxen (NAPROSYN) 500 MG tablet Take 1 tablet by mouth Every 12 (Twelve) Hours As Needed. for pain 12/10/24   Etienne Lamar MD   OLANZapine (zyPREXA) 15 MG tablet Take 1 tablet by mouth every night at bedtime.    Etienne Lamar MD   omeprazole (priLOSEC) 40 MG capsule Take 1 capsule by mouth Daily.    Etienne Lamar MD   prazosin (MINIPRESS) 2 MG capsule Take 1 capsule by mouth Every Night.    Etienne Lamar MD   pregabalin (LYRICA) 150 MG capsule Take 1 capsule by mouth Daily. 11/20/24   Etienne Lamar MD   QUEtiapine (SEROquel) 25 MG tablet TAKE 1 TABLET BY MOUTH AT BEDTIME FOR DEPRESSION 6/13/25   Etienne Lamar MD   Symbicort 160-4.5 MCG/ACT inhaler Inhale 2 puffs 2 (Two) Times a Day. 5/21/25   Etienne Lamar MD   Symbicort 80-4.5 MCG/ACT inhaler INHALE 2 PUFFS BY MOUTH TWICE DAILY AS DIRECTED 3/7/24   Etienne Lamar MD   vitamin D (ERGOCALCIFEROL) 1.25 MG (28025 UT) capsule capsule Take 1 capsule by mouth 1 (One) Time Per Week. 12/12/24   Etienne Lamar MD   ziprasidone (GEODON) 80 MG capsule Take 1 capsule by mouth 2 (Two) Times a Day With Meals. 11/8/23   Provider, Historical,  "MD        Social History:   Social History     Tobacco Use    Smoking status: Former     Current packs/day: 0.00     Types: Cigarettes     Quit date:      Years since quittin.5     Passive exposure: Never    Smokeless tobacco: Never   Vaping Use    Vaping status: Never Used   Substance Use Topics    Alcohol use: Not Currently    Drug use: Not Currently     Types: Heroin     Comment: delta 8 gummies         Review of Systems:  Review of Systems   I performed a 10 point review of systems which was all negative, except for the positives found in the HPI above.      Physical Exam:  /65   Pulse 95   Temp 98.3 °F (36.8 °C) (Oral)   Resp 18   Ht 172.7 cm (68\")   Wt 135 kg (297 lb 13.5 oz)   SpO2 98%   BMI 45.29 kg/m²         Physical Exam   General: Awake alert and in no obvious distress    HEENT: Head normocephalic atraumatic, eyes PERRLA EOMI, nose normal, oropharynx normal.    Neck: Supple full range of motion, no meningismus, no lymphadenopathy    Heart: Regular rate and rhythm, no murmurs or rubs, 2+ radial pulses bilaterally    Lungs: Clear to auscultation bilaterally without wheezes or crackles, no respiratory distress    Abdomen: Soft, nontender, nondistended, no rebound or guarding    Skin: Warm, dry, no rash    Musculoskeletal: Normal range of motion, no lower extremity edema    Neurologic: Oriented x3, no motor deficits no sensory deficits    Psychiatric: Mood appears stable, reports some depression and suicidal thoughts, no psychosis            Medical Decision Making:      Comorbidities that affect care:    Bipolar disorder, anxiety and depression, PTSD    External Notes reviewed:    None      The following orders were placed and all results were independently analyzed by me:  Orders Placed This Encounter   Procedures    Tama Draw    Comprehensive Metabolic Panel    Acetaminophen Level    Ethanol    Salicylate Level    Magnesium    Urine Drug Screen - Urine, Clean Catch    TSH Rfx On " Abnormal To Free T4    CBC Auto Differential    Fentanyl, Urine - Urine, Clean Catch    NPO Diet NPO Type: Strict NPO    Continuous Pulse Oximetry    Vital Signs    Undress & Gown    Psych / Access to See    Oxygen Therapy- Nasal Cannula; Titrate 1-6 LPM Per SpO2; 90 - 95%    POC Glucose Once    ECG 12 Lead Other; SI    Insert Peripheral IV    Suicide Precautions    CBC & Differential    Green Top (Gel)    Lavender Top    Gold Top - SST    Light Blue Top       Medications Given in the Emergency Department:  Medications   sodium chloride 0.9 % flush 10 mL (has no administration in time range)        ED Course:    ED Course as of 06/30/25 1508   Mon Jun 30, 2025   1417 EKG: I interpreted her twelve-lead EKG as normal sinus rhythm at 98 bpm, normal P waves, normal QRS, normal ST segments and T waves; no acute ischemia or ectopy. [VS]      ED Course User Index  [VS] Lobo Coker MD       Labs:    Lab Results (last 24 hours)       Procedure Component Value Units Date/Time    CBC & Differential [[id number]]  (Abnormal) Collected: 06/30/25 1305    Specimen: Blood Updated: 06/30/25 1504    Narrative:      The following orders were created for panel order CBC & Differential.  Procedure                               Abnormality         Status                     ---------                               -----------         ------                     CBC Auto Differential[[id number]]        Abnormal            Final result               Scan Slide[[id number]]                                                                    Please view results for these tests on the individual orders.    Comprehensive Metabolic Panel [[id number]]  (Abnormal) Collected: 06/30/25 1305    Specimen: Blood Updated: 06/30/25 1341     Glucose 101 mg/dL      BUN 8.3 mg/dL      Creatinine 0.98 mg/dL      Sodium 136 mmol/L      Potassium 4.0 mmol/L      Chloride 101 mmol/L      CO2 21.5 mmol/L      Calcium 9.5 mg/dL      Total Protein 7.7 g/dL       Albumin 4.6 g/dL      ALT (SGPT) 60 U/L      AST (SGOT) 57 U/L      Alkaline Phosphatase 114 U/L      Total Bilirubin 0.7 mg/dL      Globulin 3.1 gm/dL      A/G Ratio 1.5 g/dL      BUN/Creatinine Ratio 8.5     Anion Gap 13.5 mmol/L      eGFR 76.4 mL/min/1.73     Narrative:      GFR Categories in Chronic Kidney Disease (CKD)              GFR Category          GFR (mL/min/1.73)    Interpretation  G1                    90 or greater        Normal or high (1)  G2                    60-89                Mild decrease (1)  G3a                   45-59                Mild to moderate decrease  G3b                   30-44                Moderate to severe decrease  G4                    15-29                Severe decrease  G5                    14 or less           Kidney failure    (1)In the absence of evidence of kidney disease, neither GFR category G1 or G2 fulfill the criteria for CKD.    eGFR calculation 2021 CKD-EPI creatinine equation, which does not include race as a factor    Acetaminophen Level [[id number]]  (Normal) Collected: 06/30/25 1305    Specimen: Blood Updated: 06/30/25 1341     Acetaminophen <5.0 mcg/mL     Ethanol [[id number]] Collected: 06/30/25 1305    Specimen: Blood Updated: 06/30/25 1341     Ethanol <10 mg/dL      Ethanol % <0.010 %     Narrative:      Not for legal purposes.    Salicylate Level [[id number]]  (Normal) Collected: 06/30/25 1305    Specimen: Blood Updated: 06/30/25 1341     Salicylate <0.3 mg/dL     Magnesium [[id number]]  (Normal) Collected: 06/30/25 1305    Specimen: Blood Updated: 06/30/25 1341     Magnesium 2.1 mg/dL     TSH Rfx On Abnormal To Free T4 [[id number]]  (Normal) Collected: 06/30/25 1305    Specimen: Blood Updated: 06/30/25 1343     TSH 2.140 uIU/mL     Urine Drug Screen - Urine, Clean Catch [[id number]]  (Abnormal) Collected: 06/30/25 1425    Specimen: Urine, Clean Catch Updated: 06/30/25 1448     THC, Screen, Urine Positive     Phencyclidine (PCP), Urine Negative     Cocaine  Screen, Urine Negative     Methamphetamine, Ur Negative     Opiate Screen Negative     Amphetamine Screen, Urine Negative     Benzodiazepine Screen, Urine Positive     Tricyclic Antidepressants Screen Positive     Methadone Screen, Urine Negative     Barbiturates Screen, Urine Negative     Oxycodone Screen, Urine Negative     Buprenorphine, Screen, Urine Negative    Narrative:      Cutoff For Drugs Screened:    Amphetamines               500 ng/ml  Barbiturates               200 ng/ml  Benzodiazepines            150 ng/ml  Cocaine                    150 ng/ml  Methadone                  200 ng/ml  Opiates                    100 ng/ml  Phencyclidine               25 ng/ml  THC                         50 ng/ml  Methamphetamine            500 ng/ml  Tricyclic Antidepressants  300 ng/ml  Oxycodone                  100 ng/ml  Buprenorphine               10 ng/ml    The normal value for all drugs tested is negative. This report includes unconfirmed screening results, with the cutoff values listed, to be used for medical treatment purposes only.  Unconfirmed results must not be used for non-medical purposes such as employment or legal testing.  Clinical consideration should be applied to any drug of abuse test, particularly when unconfirmed results are used.      Fentanyl, Urine - Urine, Clean Catch [[id number]]  (Normal) Collected: 06/30/25 1425    Specimen: Urine, Clean Catch Updated: 06/30/25 1448     Fentanyl, Urine Negative    Narrative:      Negative Threshold:      Fentanyl 5 ng/mL     The normal value for the drug tested is negative. This report includes final unconfirmed screening results to be used for medical treatment purposes only. Unconfirmed results must not be used for non-medical purposes such as employment or legal testing. Clinical consideration should be applied to any drug of abuse test, particularly when unconfirmed results are used.           CBC Auto Differential [[id number]]  (Abnormal) Collected:  06/30/25 1459    Specimen: Blood Updated: 06/30/25 1504     WBC 7.99 10*3/mm3      RBC 4.12 10*6/mm3      Hemoglobin 13.1 g/dL      Hematocrit 38.1 %      MCV 92.5 fL      MCH 31.8 pg      MCHC 34.4 g/dL      RDW 13.9 %      RDW-SD 46.6 fl      MPV 10.6 fL      Platelets 276 10*3/mm3      Neutrophil % 66.3 %      Lymphocyte % 28.8 %      Monocyte % 4.6 %      Eosinophil % 0.0 %      Basophil % 0.0 %      Immature Grans % 0.3 %      Neutrophils, Absolute 5.30 10*3/mm3      Lymphocytes, Absolute 2.30 10*3/mm3      Monocytes, Absolute 0.37 10*3/mm3      Eosinophils, Absolute 0.00 10*3/mm3      Basophils, Absolute 0.00 10*3/mm3      Immature Grans, Absolute 0.02 10*3/mm3      nRBC 0.0 /100 WBC              Imaging:    No Radiology Exams Resulted Within Past 24 Hours      Differential Diagnosis and Discussion:    Psychiatric: Differential diagnosis includes but is not limited to depression, psychosis, bipolar disorder, anxiety, manic episode, schizophrenia, and substance abuse.    PROCEDURES:    Labs were collected in the emergency department and all labs were reviewed and interpreted by me.  An EKG was performed and the EKG was interpreted by me.    ECG 12 Lead Other; SI   Preliminary Result   HEART RATE=98  bpm   RR Whpgsvud=017  ms   AR Mmsliswh=348  ms   P Horizontal Axis=41  deg   P Front Axis=60  deg   QRSD Mjnbxswj=861  ms   QT Nankopwz=558  ms   RYjE=437  ms   QRS Axis=35  deg   T Wave Axis=11  deg   - NORMAL ECG -   Sinus rhythm   When compared with ECG of 18-May-2025 18:32:43,   No significant change   Date and Time of Study:2025-06-30 13:01:12          Procedures    MDM     Amount and/or Complexity of Data Reviewed  Clinical lab tests: reviewed  Tests in the medicine section of CPT®: reviewed             This patient is a 39-year-old female who reports overdosing on about 10-15 Benadryl tablets last night around dinnertime and drink some alcohol.    She is wide-awake here and not showing any anticholinergic  syndrome from Benadryl overdose including no dilated pupils or mumbling speech or confusion or tachycardia seen.    We have observed her for few hours in the ED.    Alcohol and Tylenol and salicylate levels are negative.    I have reviewed her urine drug screen, which was positive for THC benzodiazepines and TCAs.    Otherwise I think she can be medically cleared at this time to be sent over to [facility] for further management of her depression and suicidal thoughts.      I had the ED  confirmed [facility] that the patient actually already has a bed and now that she is medically cleared can just be discharged from the ED with her [facility] nursing staff at the bedside.                Patient Care Considerations:          Consultants/Shared Management Plan:    I spoke with the ED clinical  who is confirmed that the patient already has a bed for admission at [facility] once we have medically cleared her    Social Determinants of Health:    Patient is independent, reliable, and has access to care.       Disposition and Care Coordination:    Discharged: I considered escalation of care by admitting this patient to the hospital, however patient looks clinically improved and workup here essentially negative.        Final diagnoses:   Intentional diphenhydramine overdose, initial encounter   Depression with suicidal ideation   Medical clearance for psychiatric admission        ED Disposition       ED Disposition   Discharge    Condition   Stable    Comment   --               This medical record created using voice recognition software.             Lobo Coker MD  06/30/25 1301

## 2025-06-30 NOTE — DISCHARGE INSTRUCTIONS
You are medically cleared at this time to be sent back to [facility].    No signs of alcohol or Tylenol or aspirin were found in your system and the Benadryl appears to already be wearing off today.    Your urine drug screen was positive for THC and also benzodiazepines and TCAs, but these last 2 are due to the medications you are prescribed such as Xanax and amitriptyline.

## 2025-06-30 NOTE — ED NOTES
"Patient to ED from [facility] for SI attempt. Patient self administered 15- 50mg benadryl tablets last night @ 1800 as well as consumed alcohol.   [facility] sitter at bedside with patient, here for a medical clearance, already has placement per [facility].   Patient states she has \"a kid at home who was throwing a fit about not having any good food in the house, there was a lot of screaming and door slamming and it was just too much\".  "

## 2025-07-03 LAB
QT INTERVAL: 377 MS
QTC INTERVAL: 482 MS

## (undated) DEVICE — BNDG ELAS ECON W/CLIP 4IN 5YD LF STRL

## (undated) DEVICE — C-ARM DRAPE  44 X 77 GUSSET FIT: Brand: OPTICS ONE

## (undated) DEVICE — DUAL LUMEN STOMACH TUBE,ANTI-REFLUX VALVE: Brand: SALEM SUMP

## (undated) DEVICE — EXTREMITY-LF: Brand: MEDLINE INDUSTRIES, INC.

## (undated) DEVICE — SLV SCD KN/LEN ADJ EXPRSS BLENDED MD 1P/U

## (undated) DEVICE — GLV SURG SENSICARE SLT PF LF 9 STRL

## (undated) DEVICE — GAUZE,SPONGE,4"X4",16PLY,STRL,LF,10/TRAY: Brand: MEDLINE

## (undated) DEVICE — INTENDED FOR TISSUE SEPARATION, AND OTHER PROCEDURES THAT REQUIRE A SHARP SURGICAL BLADE TO PUNCTURE OR CUT.: Brand: BARD-PARKER ® CARBON RIB-BACK BLADES

## (undated) DEVICE — DRSNG WND GZ CURAD OIL EMULSION 3X3IN STRL

## (undated) DEVICE — BNDG ELAS CO-FLEX SLF ADHR 6IN 5YD LF STRL

## (undated) DEVICE — T AND A PACK: Brand: MEDLINE INDUSTRIES, INC.

## (undated) DEVICE — PENCL E/S SMOKEEVAC W/TELESCP CANN

## (undated) DEVICE — BANDAGE,GAUZE,BULKEE II,4.5"X4.1YD,STRL: Brand: MEDLINE

## (undated) DEVICE — BNDG ESMARK 4IN 12FT LF STRL BLU

## (undated) DEVICE — TOWEL,OR,DSP,ST,BLUE,STD,4/PK,20PK/CS: Brand: MEDLINE

## (undated) DEVICE — GLV SURG BIOGEL LTX PF 7 1/2

## (undated) DEVICE — GOWN,REINF,POLY,SIRUS,BRTH SLV,XLNG/XXL: Brand: MEDLINE

## (undated) DEVICE — CATH URETH INTRMIT ALLPURP LTX 16F RED

## (undated) DEVICE — UNDERCAST PADDING: Brand: DEROYAL